# Patient Record
Sex: FEMALE | Race: WHITE | NOT HISPANIC OR LATINO | Employment: FULL TIME | URBAN - NONMETROPOLITAN AREA
[De-identification: names, ages, dates, MRNs, and addresses within clinical notes are randomized per-mention and may not be internally consistent; named-entity substitution may affect disease eponyms.]

---

## 2018-10-22 ENCOUNTER — OFFICE VISIT (OUTPATIENT)
Dept: NEUROSURGERY | Facility: CLINIC | Age: 38
End: 2018-10-22

## 2018-10-22 VITALS
SYSTOLIC BLOOD PRESSURE: 140 MMHG | BODY MASS INDEX: 41.02 KG/M2 | WEIGHT: 293 LBS | DIASTOLIC BLOOD PRESSURE: 92 MMHG | HEIGHT: 71 IN

## 2018-10-22 DIAGNOSIS — M50.20 DISPLACEMENT OF CERVICAL INTERVERTEBRAL DISC WITHOUT MYELOPATHY: Primary | ICD-10-CM

## 2018-10-22 DIAGNOSIS — Z78.9 NON-SMOKER: ICD-10-CM

## 2018-10-22 PROCEDURE — 99204 OFFICE O/P NEW MOD 45 MIN: CPT | Performed by: NURSE PRACTITIONER

## 2018-10-22 RX ORDER — METHYLPREDNISOLONE 4 MG/1
TABLET ORAL
Qty: 21 EACH | Refills: 0 | Status: SHIPPED | OUTPATIENT
Start: 2018-10-22 | End: 2019-01-03

## 2018-10-22 NOTE — PROGRESS NOTES
Chief complaint:   Chief Complaint   Patient presents with   • Neck Pain     Omid is referred today for follow up on her neck pain, she is also having right shoulder and arm pain that goes down into her right hand with some numbness.  She has not had any physical therapy or pain management.  She does bring an MRI of ther cervical with her today for review.        Subjective     HPI: This is a 38-year-old female patient who was referred to us by Dr. Rodney for neck and right arm pain.  She is here to be evaluated today.  She states that the pain in her neck began on June 18.  There is no accident or injury associated with this.  The pain in her neck is constant.  It is worse with repetitive movements it is better with ice.  She has pain that radiates from her neck all the way down into her right upper extremity in a radicular fashion to her hand.  She says this pain is intermittent but it is daily.  It is worse with repetitive movements and better with rest and ice.  She has not done any recent physical therapy or pain management injections.  She is try chiropractic care and had minimal relief.  Denies any bowel or bladder incontinence.  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  She is left-hand dominant.  She is .  She does have 3 children.  She also does have numbness and tingling in her right hand as well.  She is had previous carpal tunnel surgery in her right hand states that she did get improvement from this but this is a different issue at this time.    Review of Systems   Respiratory: Positive for cough.    Musculoskeletal: Positive for neck pain and neck stiffness.   Neurological: Positive for weakness and numbness.   All other systems reviewed and are negative.       Past Medical History:   Diagnosis Date   • No known health problems      Past Surgical History:   Procedure Laterality Date   • CARPAL TUNNEL RELEASE Bilateral    • ULNAR NERVE DECOMPRESSION Left      Family History  "  Problem Relation Age of Onset   • Arthritis Mother    • Diabetes Mother    • Heart disease Father    • No Known Problems Brother      Social History   Substance Use Topics   • Smoking status: Never Smoker   • Smokeless tobacco: Never Used   • Alcohol use No       (Not in a hospital admission)  Allergies:  Patient has no known allergies.    Objective      Vital Signs  /92 (BP Location: Right arm, Patient Position: Sitting)   Ht 180.3 cm (71\")   Wt (!) 155 kg (341 lb 12.8 oz)   BMI 47.67 kg/m²     Physical Exam   Constitutional: She is oriented to person, place, and time. She appears well-developed and well-nourished.   HENT:   Head: Normocephalic.   Eyes: Pupils are equal, round, and reactive to light. Conjunctivae, EOM and lids are normal.   Neck: Normal range of motion.   Cardiovascular: Normal rate, regular rhythm and normal heart sounds.    Pulmonary/Chest: Effort normal and breath sounds normal.   Abdominal: Normal appearance.   Musculoskeletal: Normal range of motion.        Cervical back: She exhibits pain.   Neurological: She is alert and oriented to person, place, and time. She has normal strength and normal reflexes. She displays normal reflexes. No cranial nerve deficit or sensory deficit. GCS eye subscore is 4. GCS verbal subscore is 5. GCS motor subscore is 6.   Skin: Skin is warm.   Psychiatric: She has a normal mood and affect. Her speech is normal and behavior is normal. Thought content normal. Cognition and memory are normal.       Results Review: MRI of the cervical spine that was done at [facility] shows that she has a C3-for spurring off to the left causing neural foraminal narrowing.  At C5-6 she does have a disc displacement that is causing pressure on the exiting nerve root on the right.  No fracture visual eyes.  No cord signal change.  She does have loss of her normal cervical curvature.          Assessment/Plan: At this point I am going to start the patient into a " dedicated course of physical therapy consisting of 2-3 times a week for 4-6 weeks.  Should she not have any improvement from the therapy she may be a good candidate for a single level ACDF at C5-6.  I also did refer the patient to bariatric for weight loss management and discussion with a dietitian.  I did discuss this in detail with the patient regarding her weight.  She acknowledged understanding of this.  I will also start her on a Medrol Dosepak to see this will help with the pain that she is experiencing.  BMI shows that she is very overweight.  BMI chart was given the patient.  She is a nonsmoker      Dipika was seen today for neck pain.    Diagnoses and all orders for this visit:    Displacement of cervical intervertebral disc without myelopathy  -     Ambulatory Referral to Physical Therapy Evaluate and treat (2-3 days a week for 4-6 weeks)  -     Ambulatory Referral to Bariatric Surgery    Non-smoker    BMI 45.0-49.9, adult (CMS/[facility])    Other orders  -     MethylPREDNISolone (MEDROL, VERONICA,) 4 MG tablet; Take as directed on package instructions.          I discussed the patients findings and my recommendations with patient    Mehul Narvaez, MALI  10/22/18  11:55 AM

## 2018-12-07 ENCOUNTER — TELEPHONE (OUTPATIENT)
Dept: NEUROSURGERY | Facility: CLINIC | Age: 38
End: 2018-12-07

## 2018-12-07 NOTE — TELEPHONE ENCOUNTER
Patient calls, stating that the numbness/pain in her arm is worsening. She states that she completed the steroid daniele that was given to her but this did not improve her symptoms. She is requesting something additional, specifically a muscle relaxer. Advised her the providers and medical assistant was out today, but would forward for their review on Monday. Please return patient's call.

## 2018-12-10 RX ORDER — TIZANIDINE 4 MG/1
4 TABLET ORAL EVERY 8 HOURS PRN
Qty: 90 TABLET | Refills: 2 | Status: SHIPPED | OUTPATIENT
Start: 2018-12-10 | End: 2019-08-20 | Stop reason: SDUPTHER

## 2018-12-10 NOTE — TELEPHONE ENCOUNTER
I sent Zanaflex to her pharmacy.  We can see about getting her in sooner.  Elkins, IF Dr. Vernon has an opening she is a surgical candidate.  If Dr. Vernon does not have anything sooner than we can try and work her in on my schedule on a day when Dr. Vernon is here.

## 2018-12-11 ENCOUNTER — APPOINTMENT (OUTPATIENT)
Dept: BARIATRICS/WEIGHT MGMT | Facility: HOSPITAL | Age: 38
End: 2018-12-11

## 2019-01-03 ENCOUNTER — OFFICE VISIT (OUTPATIENT)
Dept: NEUROSURGERY | Facility: CLINIC | Age: 39
End: 2019-01-03

## 2019-01-03 VITALS
SYSTOLIC BLOOD PRESSURE: 136 MMHG | BODY MASS INDEX: 41.02 KG/M2 | WEIGHT: 293 LBS | HEIGHT: 71 IN | DIASTOLIC BLOOD PRESSURE: 82 MMHG

## 2019-01-03 DIAGNOSIS — M50.20 DISPLACEMENT OF CERVICAL INTERVERTEBRAL DISC WITHOUT MYELOPATHY: Primary | ICD-10-CM

## 2019-01-03 DIAGNOSIS — Z78.9 NON-SMOKER: ICD-10-CM

## 2019-01-03 PROCEDURE — 99213 OFFICE O/P EST LOW 20 MIN: CPT | Performed by: NEUROLOGICAL SURGERY

## 2019-01-03 RX ORDER — CYCLOBENZAPRINE HCL 10 MG
TABLET ORAL
Refills: 0 | COMMUNITY
Start: 2018-12-07 | End: 2019-01-03 | Stop reason: ALTCHOICE

## 2019-01-03 RX ORDER — GABAPENTIN 100 MG/1
CAPSULE ORAL
COMMUNITY
End: 2019-09-17 | Stop reason: DRUGHIGH

## 2019-01-03 RX ORDER — CALCIPOTRIENE 50 UG/G
CREAM TOPICAL
Refills: 1 | COMMUNITY
Start: 2018-12-31 | End: 2019-11-05

## 2019-01-03 NOTE — PATIENT INSTRUCTIONS
PATIENT TO CONTINUE TO FOLLOW UP WITH HER PRIMARY CARE PROVIDER FOR YEARLY PHYSICAL EXAMS TO ENSURE COMPLETE HEALTH MAINTENANCE      BMI for Adults  Body mass index (BMI) is a number that is calculated from a person's weight and height. In most adults, the number is used to find how much of an adult's weight is made up of fat. BMI is not as accurate as a direct measure of body fat.  How is BMI calculated?  BMI is calculated by dividing weight in kilograms by height in meters squared. It can also be calculated by dividing weight in pounds by height in inches squared, then multiplying the resulting number by 703. Charts are available to help you find your BMI quickly and easily without doing this calculation.  How is BMI interpreted?  Health care professionals use BMI charts to identify whether an adult is underweight, at a normal weight, or overweight based on the following guidelines:  · Underweight: BMI less than 18.5.  · Normal weight: BMI between 18.5 and 24.9.  · Overweight: BMI between 25 and 29.9.  · Obese: BMI of 30 and above.    BMI is usually interpreted the same for males and females.  Weight includes both fat and muscle, so someone with a muscular build, such as an athlete, may have a BMI that is higher than 24.9. In cases like these, BMI may not accurately depict body fat. To determine if excess body fat is the cause of a BMI of 25 or higher, further assessments may need to be done by a health care provider.  Why is BMI a useful tool?  BMI is used to identify a possible weight problem that may be related to a medical problem or may increase the risk for medical problems. BMI can also be used to promote changes to reach a healthy weight.  This information is not intended to replace advice given to you by your health care provider. Make sure you discuss any questions you have with your health care provider.  Document Released: 08/29/2005 Document Revised: 04/27/2017 Document Reviewed: 05/15/2015  Jaz[employer]  Interactive Patient Education © 2018 [employer]

## 2019-01-03 NOTE — PROGRESS NOTES
SUBJECTIVE:  Patient ID: Dipika Ford is a 38 y.o. female is here today for follow-up.    Chief Complaint: Neck pain  Chief Complaint   Patient presents with   • Neck Pain     patient is here for follow up after completing 4 PT sessions (she stated she is awaiting a callback with an approval for more sessions); she states she wasn't able to do any more due to the holidays, etc.  She does state that she got a Coke for Penny and she says it has helped her symptoms until today when the pain has returned (she states she hasn't had any Coke/caffeine today)       HPI  38-year-old female been fine for neck pain and right upper extremity radicular pain.  She was sent to a dedicated course physical therapy.  She did 4 sessions which she did not feel was helpful.  She has been taking tizanidine and night.  In addition she started to consume caffeine during the day and she does feel that this is done a lot to help the pain that she is having in the right arm and neck.  If she does not drink caffeine she says she feels worse and the pain returns.    The following portions of the patient's history were reviewed and updated as appropriate: allergies, current medications, past family history, past medical history, past social history, past surgical history and problem list.    OBJECTIVE:    Review of Systems   Musculoskeletal: Positive for neck pain.   All other systems reviewed and are negative.         Physical Exam   Constitutional: She is oriented to person, place, and time. She appears well-developed and well-nourished.   HENT:   Head: Normocephalic and atraumatic.   Right Ear: Hearing normal.   Left Ear: Hearing normal.   Eyes: EOM are normal. Pupils are equal, round, and reactive to light.   Neck: Normal range of motion.   Neurological: She is alert and oriented to person, place, and time. She has normal strength and normal reflexes. No cranial nerve deficit or sensory deficit. She displays a negative Romberg  sign. GCS eye subscore is 4. GCS verbal subscore is 5. GCS motor subscore is 6. She displays no Babinski's sign on the right side. She displays no Babinski's sign on the left side.   Psychiatric: Her speech is normal. Judgment normal. Cognition and memory are normal.       Neurologic Exam     Mental Status   Oriented to person, place, and time.   Speech: speech is normal     Cranial Nerves     CN III, IV, VI   Pupils are equal, round, and reactive to light.  Extraocular motions are normal.     Motor Exam     Strength   Strength 5/5 throughout.       Independent Review of Radiographic Studies:   MRI the cervical spine does show a right paraspinal disc herniation at C5-6.    ASSESSMENT/PLAN:  The patient seems to be improving over the past month therapy and muscle relaxers and some caffeine intake during the day she says seems to improve her pain.  She is going to continue with another month of therapy.  We will see her in follow-up in 4 weeks      1. Displacement of cervical intervertebral disc without myelopathy    2. BMI 45.0-49.9, adult (CMS/HCC)    3. Non-smoker            Return in about 6 weeks (around 2/14/2019) for PT follow up w/CLARISA.      John Vernon MD

## 2019-01-28 DIAGNOSIS — M50.20 DISPLACEMENT OF CERVICAL INTERVERTEBRAL DISC WITHOUT MYELOPATHY: Primary | ICD-10-CM

## 2019-02-28 ENCOUNTER — TELEPHONE (OUTPATIENT)
Dept: NEUROSURGERY | Facility: CLINIC | Age: 39
End: 2019-02-28

## 2019-02-28 NOTE — TELEPHONE ENCOUNTER
Left message on patient's VM to call office and regarding an appointment on 02/28/19 that needs to be rescheduled as Mehul Narvaez is out of the office for family emergency.

## 2019-04-01 ENCOUNTER — OFFICE VISIT (OUTPATIENT)
Dept: NEUROSURGERY | Facility: CLINIC | Age: 39
End: 2019-04-01

## 2019-04-01 VITALS
SYSTOLIC BLOOD PRESSURE: 142 MMHG | HEIGHT: 71 IN | WEIGHT: 293 LBS | DIASTOLIC BLOOD PRESSURE: 88 MMHG | BODY MASS INDEX: 41.02 KG/M2

## 2019-04-01 DIAGNOSIS — Z78.9 NON-SMOKER: ICD-10-CM

## 2019-04-01 DIAGNOSIS — M50.20 DISPLACEMENT OF CERVICAL INTERVERTEBRAL DISC WITHOUT MYELOPATHY: Primary | ICD-10-CM

## 2019-04-01 PROCEDURE — 99213 OFFICE O/P EST LOW 20 MIN: CPT | Performed by: NURSE PRACTITIONER

## 2019-04-01 NOTE — PROGRESS NOTES
"    Chief complaint:   Chief Complaint   Patient presents with   • Neck Pain     Dipika is returning today after some physical therapy for her neck pain, she states that is has helped some but the pain is still there.         Subjective     HPI: This is a 39-year-old female patient who we have been following for the C5-6 displacement and having neck pain and right arm pain.  She is here to be evaluated today after physical therapy.  She says that she is no longer having any radicular arm pain.  She is having pain from her neck over to the shoulder.  Denies any bowel or bladder incontinence.  She has done 9 sessions of therapy since we seen her last and states that she did not get any worse from the therapy.  She says that the majority of the time it is the numbness in her arm that bothers her more.  She says that it bothers her more so when she is sitting but if she is up moving or driving does not bother her.    Review of Systems   Musculoskeletal: Positive for neck pain.   Neurological: Positive for numbness.         Objective      Vital Signs  /88 (BP Location: Right arm, Patient Position: Sitting)   Ht 180.3 cm (71\")   Wt (!) 148 kg (327 lb 3.2 oz)   BMI 45.64 kg/m²     Physical Exam   Constitutional: She is oriented to person, place, and time. She appears well-developed and well-nourished.   HENT:   Head: Normocephalic.   Eyes: EOM are normal. Pupils are equal, round, and reactive to light.   Neck: Normal range of motion.   Pulmonary/Chest: Effort normal.   Musculoskeletal: Normal range of motion.        Cervical back: She exhibits pain.   Neurological: She is alert and oriented to person, place, and time. She has normal strength and normal reflexes. No cranial nerve deficit or sensory deficit. Gait normal. GCS eye subscore is 4. GCS verbal subscore is 5. GCS motor subscore is 6.   Skin: Skin is warm.   Psychiatric: She has a normal mood and affect. Her speech is normal and behavior is normal. Thought " content normal.       Results Review: No new imaging          Assessment/Plan: At this point I would suggest the patient go for a dedicated trial of injections in her neck to see if this will help with the pain and the numbness and tingling that she is experiencing.  She is agreeable to this and would like to try this before any surgical intervention.  We will follow-up with her in 4 months at which time she will see Dr. Vernon.    BMI shows the patient is Extremely Obese. BMI chart was given to the patient. Patient is a non-smoker    Dipika was seen today for neck pain.    Diagnoses and all orders for this visit:    Displacement of cervical intervertebral disc without myelopathy  -     Ambulatory Referral to Pain Management    Non-smoker    BMI 45.0-49.9, adult (CMS/[facility])        I discussed the patients findings and my recommendations with patient  MALI Valdez  04/01/19  10:47 AM

## 2019-04-01 NOTE — PATIENT INSTRUCTIONS

## 2019-08-20 RX ORDER — TIZANIDINE 4 MG/1
TABLET ORAL
Qty: 90 TABLET | Refills: 0 | Status: SHIPPED | OUTPATIENT
Start: 2019-08-20 | End: 2019-10-06 | Stop reason: SDUPTHER

## 2019-09-17 ENCOUNTER — OFFICE VISIT (OUTPATIENT)
Dept: NEUROSURGERY | Facility: CLINIC | Age: 39
End: 2019-09-17

## 2019-09-17 VITALS
SYSTOLIC BLOOD PRESSURE: 140 MMHG | DIASTOLIC BLOOD PRESSURE: 82 MMHG | WEIGHT: 293 LBS | BODY MASS INDEX: 41.02 KG/M2 | HEIGHT: 71 IN

## 2019-09-17 DIAGNOSIS — M79.601 RIGHT ARM PAIN: ICD-10-CM

## 2019-09-17 DIAGNOSIS — M50.20 DISPLACEMENT OF CERVICAL INTERVERTEBRAL DISC WITHOUT MYELOPATHY: Primary | ICD-10-CM

## 2019-09-17 DIAGNOSIS — Z78.9 NON-SMOKER: ICD-10-CM

## 2019-09-17 DIAGNOSIS — R20.2 NUMBNESS AND TINGLING OF RIGHT UPPER EXTREMITY: ICD-10-CM

## 2019-09-17 DIAGNOSIS — R20.0 NUMBNESS AND TINGLING OF RIGHT UPPER EXTREMITY: ICD-10-CM

## 2019-09-17 PROCEDURE — 99213 OFFICE O/P EST LOW 20 MIN: CPT | Performed by: NEUROLOGICAL SURGERY

## 2019-09-17 RX ORDER — GABAPENTIN 300 MG/1
CAPSULE ORAL
Refills: 1 | COMMUNITY
Start: 2019-08-29 | End: 2020-01-03

## 2019-09-17 RX ORDER — LIDOCAINE AND PRILOCAINE 25; 25 MG/G; MG/G
CREAM TOPICAL
Refills: 5 | COMMUNITY
Start: 2019-08-19 | End: 2019-11-05

## 2019-09-17 RX ORDER — DICLOFENAC SODIUM 75 MG/1
75 TABLET, DELAYED RELEASE ORAL 2 TIMES DAILY
Refills: 1 | COMMUNITY
Start: 2019-08-29 | End: 2020-01-02 | Stop reason: SDUPTHER

## 2019-09-17 RX ORDER — DESIPRAMINE HYDROCHLORIDE 25 MG/1
25 TABLET ORAL DAILY
Refills: 1 | COMMUNITY
Start: 2019-08-29 | End: 2020-01-03

## 2019-09-17 RX ORDER — CAPSAICIN 0.025 %
CREAM (GRAM) TOPICAL
Refills: 2 | COMMUNITY
Start: 2019-07-22 | End: 2019-11-05

## 2019-09-17 NOTE — PROGRESS NOTES
SUBJECTIVE:  Patient ID: Dipika Ford is a 39 y.o. female is here today for follow-up.    Chief Complaint: Neck pain  Chief Complaint   Patient presents with   • Neck Pain     patient is here for 5 month follow up after PMI; patient states the first injection helped for about a week but the second hasn't helped at all.       HPI  This a 39-year-old female we have been following for about a year now for complaints of neck pain and some cervical disc herniation.  She did a dedicated course of physical therapy which did resolve a lot of her radicular pain symptoms in her right arm.  She continued to have some right paraspinal neck pain and right trapezius pain.  She had pretty consistent with complaining of numbness and tingling in her right hand.  She is had 2 injections from Dr. garcia and she says that did help for 1 to 2 weeks with her neck pain and right paraspinal neck pain.  Today her biggest complaint though is this numbness and tingling in her right hand forearm that a sense of her arm.  She says this is a bothersome and uncomfortable.  It is worse with sitting does not bother her at night.  She has had carpal tunnel syndrome in the right and left hands in the past and had carpal tunnel release surgery.  She is very clear that this does not feel like carpal tunnel syndrome.    The following portions of the patient's history were reviewed and updated as appropriate: allergies, current medications, past family history, past medical history, past social history, past surgical history and problem list.    OBJECTIVE:    Review of Systems   Musculoskeletal: Positive for neck pain.   Neurological: Positive for numbness.   All other systems reviewed and are negative.         Physical Exam   Constitutional: She is oriented to person, place, and time. She appears well-developed and well-nourished.   HENT:   Head: Normocephalic and atraumatic.   Right Ear: Hearing normal.   Left Ear: Hearing normal.   Eyes: EOM are  normal. Pupils are equal, round, and reactive to light.   Neck: Normal range of motion.   Neurological: She is alert and oriented to person, place, and time. She has normal strength and normal reflexes. No cranial nerve deficit or sensory deficit. She displays a negative Romberg sign. GCS eye subscore is 4. GCS verbal subscore is 5. GCS motor subscore is 6. She displays no Babinski's sign on the right side. She displays no Babinski's sign on the left side.   Psychiatric: Her speech is normal. Judgment normal. Cognition and memory are normal.       Neurologic Exam     Mental Status   Oriented to person, place, and time.   Speech: speech is normal     Cranial Nerves     CN III, IV, VI   Pupils are equal, round, and reactive to light.  Extraocular motions are normal.     Motor Exam     Strength   Strength 5/5 throughout.   Negative Phalen sign    Independent Review of Radiographic Studies:   MRI of the cervical spine from over a year ago shows a right paraspinal disc herniation at C5-6    ASSESSMENT/PLAN:  The patient's symptoms have all been changed over the past year to the point now her biggest complaint is this uncomfortable numbness and tingling in her right hand forearm which a sense of her arm.  This certainly could be from her disc herniation at C5-6 but it is a little atypical.  I would recommend EMG nerve conduction study as well as repeat of her MRI of the cervical spine.  At that point we can make a better recommendation as to whether surgical intervention in her neck would be appropriate.  We will see her in follow-up after that testing is completed.      1. Displacement of cervical intervertebral disc without myelopathy    2. Numbness and tingling of right upper extremity    3. Right arm pain    4. BMI 40.0-44.9, adult (CMS/HCC)    5. Non-smoker            No Follow-up on file.      John Vernon MD

## 2019-10-07 RX ORDER — TIZANIDINE 4 MG/1
TABLET ORAL
Qty: 90 TABLET | Refills: 0 | Status: SHIPPED | OUTPATIENT
Start: 2019-10-07 | End: 2019-11-05 | Stop reason: SDUPTHER

## 2019-10-14 ENCOUNTER — HOSPITAL ENCOUNTER (OUTPATIENT)
Dept: MRI IMAGING | Facility: HOSPITAL | Age: 39
Discharge: HOME | End: 2019-10-14

## 2019-10-14 ENCOUNTER — HOSPITAL ENCOUNTER (OUTPATIENT)
Dept: NEUROLOGY | Facility: HOSPITAL | Age: 39
Discharge: HOME | End: 2019-10-14
Admitting: NEUROLOGICAL SURGERY

## 2019-10-14 DIAGNOSIS — M79.601 RIGHT ARM PAIN: ICD-10-CM

## 2019-10-14 DIAGNOSIS — R20.0 NUMBNESS AND TINGLING OF RIGHT UPPER EXTREMITY: ICD-10-CM

## 2019-10-14 DIAGNOSIS — M50.20 DISPLACEMENT OF CERVICAL INTERVERTEBRAL DISC WITHOUT MYELOPATHY: ICD-10-CM

## 2019-10-14 DIAGNOSIS — R20.2 NUMBNESS AND TINGLING OF RIGHT UPPER EXTREMITY: ICD-10-CM

## 2019-10-14 PROCEDURE — 95886 MUSC TEST DONE W/N TEST COMP: CPT

## 2019-10-14 PROCEDURE — 95909 NRV CNDJ TST 5-6 STUDIES: CPT

## 2019-10-14 PROCEDURE — 72141 MRI NECK SPINE W/O DYE: CPT

## 2019-11-05 ENCOUNTER — OFFICE VISIT (OUTPATIENT)
Dept: NEUROSURGERY | Facility: CLINIC | Age: 39
End: 2019-11-05

## 2019-11-05 VITALS
SYSTOLIC BLOOD PRESSURE: 132 MMHG | WEIGHT: 293 LBS | DIASTOLIC BLOOD PRESSURE: 80 MMHG | BODY MASS INDEX: 41.02 KG/M2 | HEIGHT: 71 IN

## 2019-11-05 DIAGNOSIS — R20.2 NUMBNESS AND TINGLING OF RIGHT UPPER EXTREMITY: ICD-10-CM

## 2019-11-05 DIAGNOSIS — R20.0 NUMBNESS AND TINGLING OF RIGHT UPPER EXTREMITY: ICD-10-CM

## 2019-11-05 DIAGNOSIS — M50.20 DISPLACEMENT OF CERVICAL INTERVERTEBRAL DISC WITHOUT MYELOPATHY: Primary | ICD-10-CM

## 2019-11-05 DIAGNOSIS — Z78.9 NON-SMOKER: ICD-10-CM

## 2019-11-05 PROCEDURE — 99214 OFFICE O/P EST MOD 30 MIN: CPT | Performed by: NEUROLOGICAL SURGERY

## 2019-11-05 RX ORDER — TIZANIDINE 4 MG/1
TABLET ORAL
Qty: 90 TABLET | Refills: 0 | Status: SHIPPED | OUTPATIENT
Start: 2019-11-05 | End: 2019-12-04 | Stop reason: SDUPTHER

## 2019-11-05 NOTE — H&P
SUBJECTIVE:  Patient ID: Dipika Ford is a 39 y.o. female is here today for follow-up.    Chief Complaint: Neck pain  Chief Complaint   Patient presents with   • neck & arm pain     patient has complaints of N/T in her right hand and forearm; she is here for follow up to discuss the MRI findings (P on 10/14/19) and the RUE EMG/NCV results (10/14/19 [facility])       HPI  39-year-old female that we have been following for right paraspinal neck pain, right upper extremity radicular pain along with numbness and tingling.  She is gone through an extensive course of conservative care involving physical therapy, medication, anti-inflammatories, pain management injections.  She has had mixed results but still has a significant amount of lingering symptoms.  We sent her for repeat MRI and an EMG nerve conduction study she is here to discuss the results.    The following portions of the patient's history were reviewed and updated as appropriate: allergies, current medications, past family history, past medical history, past social history, past surgical history and problem list.    OBJECTIVE:    Review of Systems   Musculoskeletal: Positive for neck pain.   Neurological: Positive for numbness.   All other systems reviewed and are negative.         Physical Exam   Constitutional: She is oriented to person, place, and time.   Neurological: She is oriented to person, place, and time. She has normal strength. She has a normal Finger-Nose-Finger Test. Gait normal.   Reflex Scores:       Tricep reflexes are 1+ on the right side and 2+ on the left side.       Bicep reflexes are 0 on the right side and 2+ on the left side.       Brachioradialis reflexes are 0 on the right side and 2+ on the left side.  Psychiatric: Her speech is normal.       Neurologic Exam     Mental Status   Oriented to person, place, and time.   Speech: speech is normal   Level of consciousness: alert  Knowledge: good.     Cranial Nerves   Cranial  nerves II through XII intact.     Motor Exam   Muscle bulk: normal  Overall muscle tone: normal  Right arm pronator drift: absent  Left arm pronator drift: absent    Strength   Strength 5/5 throughout.     Sensory Exam   Right arm light touch: decreased from elbow  Right arm pinprick: decreased from elbow    Gait, Coordination, and Reflexes     Gait  Gait: normal    Coordination   Finger to nose coordination: normal    Tremor   Resting tremor: absent  Intention tremor: absent  Action tremor: absent    Reflexes   Right brachioradialis: 0  Left brachioradialis: 2+  Right biceps: 0  Left biceps: 2+  Right triceps: 1+  Left triceps: 2+      Independent Review of Radiographic Studies:   EMG nerve conduction study is negative.  MRI of the cervical spine shows cervical disc herniation eccentric to the right at C5-6.    ASSESSMENT/PLAN:  The patient has a disc herniation at C5-6 which is responsible for her right upper extremity symptoms.  The EMG nerve conduction study was negative for any peripheral nerve entrapment.  I would recommend a single level anterior cervical fusion at this point.  The risks and benefits were discussed at length which include but were not limited to infection, bleeding, paralysis, spinal fluid leak, speech and swallow difficulty, stroke, coma, and death.  The patient acknowledged understand this.  Her questions and concerns were addressed.  We will get her prepped and schedule this soon as possible.      1. Displacement of cervical intervertebral disc without myelopathy    2. Numbness and tingling of right upper extremity    3. BMI 40.0-44.9, adult (CMS/HCC)    4. Non-smoker            Return for postoperatively.      John Vernon MD

## 2019-12-04 RX ORDER — TIZANIDINE 4 MG/1
TABLET ORAL
Qty: 90 TABLET | Refills: 0 | Status: SHIPPED | OUTPATIENT
Start: 2019-12-04 | End: 2020-01-06

## 2019-12-30 ENCOUNTER — TELEPHONE (OUTPATIENT)
Dept: NEUROSURGERY | Facility: CLINIC | Age: 39
End: 2019-12-30

## 2020-01-02 RX ORDER — DICLOFENAC SODIUM 75 MG/1
75 TABLET, DELAYED RELEASE ORAL 2 TIMES DAILY
Qty: 60 TABLET | Refills: 1 | Status: SHIPPED | OUTPATIENT
Start: 2020-01-02 | End: 2020-01-21 | Stop reason: HOSPADM

## 2020-01-03 ENCOUNTER — APPOINTMENT (OUTPATIENT)
Dept: PREADMISSION TESTING | Facility: HOSPITAL | Age: 40
End: 2020-01-03

## 2020-01-03 VITALS
DIASTOLIC BLOOD PRESSURE: 69 MMHG | HEART RATE: 68 BPM | BODY MASS INDEX: 41.02 KG/M2 | OXYGEN SATURATION: 100 % | HEIGHT: 71 IN | WEIGHT: 293 LBS | RESPIRATION RATE: 16 BRPM | SYSTOLIC BLOOD PRESSURE: 135 MMHG

## 2020-01-03 DIAGNOSIS — M50.20 DISPLACEMENT OF CERVICAL INTERVERTEBRAL DISC WITHOUT MYELOPATHY: ICD-10-CM

## 2020-01-03 LAB
ALBUMIN SERPL-MCNC: 4.5 G/DL (ref 3.5–5.2)
ALBUMIN/GLOB SERPL: 1.5 G/DL
ALP SERPL-CCNC: 57 U/L (ref 39–117)
ALT SERPL W P-5'-P-CCNC: 18 U/L (ref 1–33)
ANION GAP SERPL CALCULATED.3IONS-SCNC: 11 MMOL/L (ref 5–15)
AST SERPL-CCNC: 15 U/L (ref 1–32)
BACTERIA UR QL AUTO: ABNORMAL /HPF
BILIRUB SERPL-MCNC: 0.4 MG/DL (ref 0.2–1.2)
BILIRUB UR QL STRIP: NEGATIVE
BUN BLD-MCNC: 14 MG/DL (ref 6–20)
BUN/CREAT SERPL: 21.5 (ref 7–25)
CALCIUM SPEC-SCNC: 9.1 MG/DL (ref 8.6–10.5)
CHLORIDE SERPL-SCNC: 102 MMOL/L (ref 98–107)
CLARITY UR: ABNORMAL
CO2 SERPL-SCNC: 26 MMOL/L (ref 22–29)
COLOR UR: ABNORMAL
CREAT BLD-MCNC: 0.65 MG/DL (ref 0.57–1)
DEPRECATED RDW RBC AUTO: 35.4 FL (ref 37–54)
ERYTHROCYTE [DISTWIDTH] IN BLOOD BY AUTOMATED COUNT: 12.2 % (ref 12.3–15.4)
GFR SERPL CREATININE-BSD FRML MDRD: 102 ML/MIN/1.73
GLOBULIN UR ELPH-MCNC: 3 GM/DL
GLUCOSE BLD-MCNC: 179 MG/DL (ref 65–99)
GLUCOSE UR STRIP-MCNC: NEGATIVE MG/DL
HCT VFR BLD AUTO: 37.7 % (ref 34–46.6)
HGB BLD-MCNC: 13 G/DL (ref 12–15.9)
HGB UR QL STRIP.AUTO: NEGATIVE
HYALINE CASTS UR QL AUTO: ABNORMAL /LPF
KETONES UR QL STRIP: NEGATIVE
LEUKOCYTE ESTERASE UR QL STRIP.AUTO: ABNORMAL
MCH RBC QN AUTO: 27.8 PG (ref 26.6–33)
MCHC RBC AUTO-ENTMCNC: 34.5 G/DL (ref 31.5–35.7)
MCV RBC AUTO: 80.7 FL (ref 79–97)
NITRITE UR QL STRIP: NEGATIVE
PH UR STRIP.AUTO: 6 [PH] (ref 5–8)
PLATELET # BLD AUTO: 217 10*3/MM3 (ref 140–450)
PMV BLD AUTO: 9.1 FL (ref 6–12)
POTASSIUM BLD-SCNC: 3.8 MMOL/L (ref 3.5–5.2)
PROT SERPL-MCNC: 7.5 G/DL (ref 6–8.5)
PROT UR QL STRIP: ABNORMAL
RBC # BLD AUTO: 4.67 10*6/MM3 (ref 3.77–5.28)
RBC # UR: ABNORMAL /HPF
REF LAB TEST METHOD: ABNORMAL
SODIUM BLD-SCNC: 139 MMOL/L (ref 136–145)
SP GR UR STRIP: >=1.03 (ref 1–1.03)
SQUAMOUS #/AREA URNS HPF: ABNORMAL /HPF
UROBILINOGEN UR QL STRIP: ABNORMAL
WBC NRBC COR # BLD: 7.89 10*3/MM3 (ref 3.4–10.8)
WBC UR QL AUTO: ABNORMAL /HPF

## 2020-01-03 PROCEDURE — 93005 ELECTROCARDIOGRAM TRACING: CPT

## 2020-01-03 PROCEDURE — 87086 URINE CULTURE/COLONY COUNT: CPT | Performed by: NEUROLOGICAL SURGERY

## 2020-01-03 PROCEDURE — 85027 COMPLETE CBC AUTOMATED: CPT | Performed by: NEUROLOGICAL SURGERY

## 2020-01-03 PROCEDURE — 93010 ELECTROCARDIOGRAM REPORT: CPT | Performed by: INTERNAL MEDICINE

## 2020-01-03 PROCEDURE — 80053 COMPREHEN METABOLIC PANEL: CPT | Performed by: NEUROLOGICAL SURGERY

## 2020-01-03 PROCEDURE — 81001 URINALYSIS AUTO W/SCOPE: CPT | Performed by: NEUROLOGICAL SURGERY

## 2020-01-03 PROCEDURE — 36415 COLL VENOUS BLD VENIPUNCTURE: CPT

## 2020-01-03 NOTE — DISCHARGE INSTRUCTIONS
DAY OF SURGERY INSTRUCTIONS        YOUR SURGEON: ***SHARON PRYOR     PROCEDURE: ***CERVICAL DISCECTOMY ANTERIOR WITH FUSION     DATE OF SURGERY: ***    ARRIVAL TIME: AS DIRECTED BY OFFICE      ALL OTHER HOME MEDICATION CHECK WITH YOUR PHYSICIAN                MANAGING PAIN AFTER SURGERY    We know you are probably wondering what your pain will be like after surgery.  Following surgery it is unrealistic to expect you will not have pain.   Pain is how our bodies let us know that something is wrong or cautions us to be careful.  That said, our goal is to make your pain tolerable.    Methods we may use to treat your pain include (oral or IV medications, PCAs, epidurals, nerve blocks, etc.)   While some procedures require IV pain medications for a short time after surgery, transitioning to pain medications by mouth allows for better management of pain.   Your nurse will encourage you to take oral pain medications whenever possible.  IV medications work almost immediately, but only last a short while.  Taking medications by mouth allows for a more constant level of medication in your blood stream for a longer period of time.      Once your pain is out of control it is harder to get back under control.  It is important you are aware when your next dose of pain medication is due.  If you are admitted, your nurse may write the time of your next dose on the white board in your room to help you remember.      We are interested in your pain and encourage you to inform us about aggravating factors during your visit.   Many times a simple repositioning every few hours can make a big difference.    If your physician says it is okay, do not let your pain prevent you from getting out of bed. Be sure to call your nurse for assistance prior to getting up so you do not fall.      Before surgery, please decide your tolerable pain goal.  These faces help describe the pain ratings we use on a 0-10 scale.   Be prepared to tell us your  goal and whether or not you take pain or anxiety medications at home.          BEFORE YOU COME TO THE HOSPITAL  (Pre-op instructions)  • Do not eat, drink, smoke or chew gum after midnight the night before surgery.  This also includes no mints.  • Morning of surgery take only the medicines you have been instructed with a sip of water unless otherwise instructed  by your physician.  • Do not shave, wear makeup or dark nail polish.  • Remove all jewelry including rings.  • Leave anything you consider valuable at home.  • Leave your suitcase in the car until after your surgery.  • Bring the following with you if applicable:  o Picture ID and insurance, Medicare or Medicaid cards  o Co-pay/deductible required by insurance (cash, check, credit card)  o Copy of advance directive, living will or power-of- documents if not brought to PAT  o CPAP or BIPAP mask and tubing  o Relaxation aids ( book, magazine), etc.  o Hearing aids                        ON THE DAY OF SURGERY  · On the day of surgery check in at registration located at the main entrance of the hospital.   ? You will be registered and given a beeper with instructions where to wait in the main lobby.  ? When your beeper lights up and vibrates a member of the Outpatient Surgery staff will meet you at the double doors under the stair steps and escort you to your preoperative room.   · You may have cloth compression devices placed on your legs. These help to prevent blood clots and reduce swelling in your legs.  · An IV may be inserted into one of your veins.  · In the operating room, you may be given one or more of the following:  ? A medicine to help you relax (sedative).  ? A medicine to numb the area (local anesthetic).  ? A medicine to make you fall asleep (general anesthetic).  ? A medicine that is injected into an area of your body to numb everything below the injection site (regional anesthetic).  · Your surgical site will be marked or  "identified.  · You may be given an antibiotic through your IV to help prevent infection.  Contact a health care provider if you:  · Develop a fever of more than 100.4°F (38°C) or other feelings of illness during the 48 hours before your surgery.  · Have symptoms that get worse.  Have questions or concerns about your surgery    General Anesthesia/Surgery, Adult  General anesthesia is the use of medicines to make a person \"go to sleep\" (unconscious) for a medical procedure. General anesthesia must be used for certain procedures, and is often recommended for procedures that:  · Last a long time.  · Require you to be still or in an unusual position.  · Are major and can cause blood loss.  The medicines used for general anesthesia are called general anesthetics. As well as making you unconscious for a certain amount of time, these medicines:  · Prevent pain.  · Control your blood pressure.  · Relax your muscles.  Tell a health care provider about:  · Any allergies you have.  · All medicines you are taking, including vitamins, herbs, eye drops, creams, and over-the-counter medicines.  · Any problems you or family members have had with anesthetic medicines.  · Types of anesthetics you have had in the past.  · Any blood disorders you have.  · Any surgeries you have had.  · Any medical conditions you have.  · Any recent upper respiratory, chest, or ear infections.  · Any history of:  ? Heart or lung conditions, such as heart failure, sleep apnea, asthma, or chronic obstructive pulmonary disease (COPD).  ?  service.  ? Depression or anxiety.  · Any tobacco or drug use, including marijuana or alcohol use.  · Whether you are pregnant or may be pregnant.  What are the risks?  Generally, this is a safe procedure. However, problems may occur, including:  · Allergic reaction.  · Lung and heart problems.  · Inhaling food or liquid from the stomach into the lungs (aspiration).  · Nerve injury.  · Air in the bloodstream, which " can lead to stroke.  · Extreme agitation or confusion (delirium) when you wake up from the anesthetic.  · Waking up during your procedure and being unable to move. This is rare.  These problems are more likely to develop if you are having a major surgery or if you have an advanced or serious medical condition. You can prevent some of these complications by answering all of your health care provider's questions thoroughly and by following all instructions before your procedure.  General anesthesia can cause side effects, including:  · Nausea or vomiting.  · A sore throat from the breathing tube.  · Hoarseness.  · Wheezing or coughing.  · Shaking chills.  · Tiredness.  · Body aches.  · Anxiety.  · Sleepiness or drowsiness.  · Confusion or agitation.  RISKS AND COMPLICATIONS OF SURGERY  Your health care provider will discuss possible risks and complications with you before surgery. Common risks and complications include:    · Problems due to the use of anesthetics.  · Blood loss and replacement (does not apply to minor surgical procedures).  · Temporary increase in pain due to surgery.  · Uncorrected pain or problems that the surgery was meant to correct.  · Infection.  · New damage.    What happens before the procedure?    Medicines  Ask your health care provider about:  · Changing or stopping your regular medicines. This is especially important if you are taking diabetes medicines or blood thinners.  · Taking medicines such as aspirin and ibuprofen. These medicines can thin your blood. Do not take these medicines unless your health care provider tells you to take them.  · Taking over-the-counter medicines, vitamins, herbs, and supplements. Do not take these during the week before your procedure unless your health care provider approves them.  General instructions  · Starting 3-6 weeks before the procedure, do not use any products that contain nicotine or tobacco, such as cigarettes and e-cigarettes. If you need help  quitting, ask your health care provider.  · If you brush your teeth on the morning of the procedure, make sure to spit out all of the toothpaste.  · Tell your health care provider if you become ill or develop a cold, cough, or fever.  · If instructed by your health care provider, bring your sleep apnea device with you on the day of your surgery (if applicable).  · Ask your health care provider if you will be going home the same day, the following day, or after a longer hospital stay.  ? Plan to have someone take you home from the hospital or clinic.  ? Plan to have a responsible adult care for you for at least 24 hours after you leave the hospital or clinic. This is important.  What happens during the procedure?  · You will be given anesthetics through both of the following:  ? A mask placed over your nose and mouth.  ? An IV in one of your veins.  · You may receive a medicine to help you relax (sedative).  · After you are unconscious, a breathing tube may be inserted down your throat to help you breathe. This will be removed before you wake up.  · An anesthesia specialist will stay with you throughout your procedure. He or she will:  ? Keep you comfortable and safe by continuing to give you medicines and adjusting the amount of medicine that you get.  ? Monitor your blood pressure, pulse, and oxygen levels to make sure that the anesthetics do not cause any problems.  The procedure may vary among health care providers and hospitals.  What happens after the procedure?  · Your blood pressure, temperature, heart rate, breathing rate, and blood oxygen level will be monitored until the medicines you were given have worn off.  · You will wake up in a recovery area. You may wake up slowly.  · If you feel anxious or agitated, you may be given medicine to help you calm down.  · If you will be going home the same day, your health care provider may check to make sure you can walk, drink, and urinate.  · Your health care  provider will treat any pain or side effects you have before you go home.  · Do not drive for 24 hours if you were given a sedative.  Summary  · General anesthesia is used to keep you still and prevent pain during a procedure.  · It is important to tell your healthcare provider about your medical history and any surgeries you have had, and previous experience with anesthesia.  · Follow your healthcare provider’s instructions about when to stop eating, drinking, or taking certain medicines before your procedure.  · Plan to have someone take you home from the hospital or clinic.  This information is not intended to replace advice given to you by your health care provider. Make sure you discuss any questions you have with your health care provider.  Document Released: 03/26/2009 Document Revised: 08/03/2018 Document Reviewed: 08/03/2018  [employer] Interactive Patient Education © 2019 [employer]       Fall Prevention in Hospitals, Adult  As a hospital patient, your condition and the treatments you receive can increase your risk for falls. Some additional risk factors for falls in a hospital include:  · Being in an unfamiliar environment.  · Being on bed rest.  · Your surgery.  · Taking certain medicines.  · Your tubing requirements, such as intravenous (IV) therapy or catheters.  It is important that you learn how to decrease fall risks while at the hospital. Below are important tips that can help prevent falls.  SAFETY TIPS FOR PREVENTING FALLS  Talk about your risk of falling.  · Ask your health care provider why you are at risk for falling. Is it your medicine, illness, tubing placement, or something else?  · Make a plan with your health care provider to keep you safe from falls.  · Ask your health care provider or pharmacist about side effects of your medicines. Some medicines can make you dizzy or affect your coordination.  Ask for help.  · Ask for help before getting out of bed. You may need to press your call  button.  · Ask for assistance in getting safely to the toilet.  · Ask for a walker or cane to be put at your bedside. Ask that most of the side rails on your bed be placed up before your health care provider leaves the room.  · Ask family or friends to sit with you.  · Ask for things that are out of your reach, such as your glasses, hearing aids, telephone, bedside table, or call button.  Follow these tips to avoid falling:  · Stay lying or seated, rather than standing, while waiting for help.  · Wear rubber-soled slippers or shoes whenever you walk in the hospital.  · Avoid quick, sudden movements.  ¨ Change positions slowly.  ¨ Sit on the side of your bed before standing.  ¨ Stand up slowly and wait before you start to walk.  · Let your health care provider know if there is a spill on the floor.  · Pay careful attention to the medical equipment, electrical cords, and tubes around you.  · When you need help, use your call button by your bed or in the bathroom. Wait for one of your health care providers to help you.  · If you feel dizzy or unsure of your footing, return to bed and wait for assistance.  · Avoid being distracted by the TV, telephone, or another person in your room.  · Do not lean or support yourself on rolling objects, such as IV poles or bedside tables.     This information is not intended to replace advice given to you by your health care provider. Make sure you discuss any questions you have with your health care provider.     Document Released: 12/15/2001 Document Revised: 01/08/2016 Document Reviewed: 08/25/2013  [employer] Interactive Patient Education ©2016 [employer]       Surgical Site Infections FAQs  What is a Surgical Site Infection (SSI)?  A surgical site infection is an infection that occurs after surgery in the part of the body where the surgery took place. Most patients who have surgery do not develop an infection. However, infections develop in about 1 to 3 out of every 100 patients  who have surgery.  Some of the common symptoms of a surgical site infection are:  · Redness and pain around the area where you had surgery  · Drainage of cloudy fluid from your surgical wound  · Fever  Can SSIs be treated?  Yes. Most surgical site infections can be treated with antibiotics. The antibiotic given to you depends on the bacteria (germs) causing the infection. Sometimes patients with SSIs also need another surgery to treat the infection.  What are some of the things that hospitals are doing to prevent SSIs?  To prevent SSIs, doctors, nurses, and other healthcare providers:  · Clean their hands and arms up to their elbows with an antiseptic agent just before the surgery.  · Clean their hands with soap and water or an alcohol-based hand rub before and after caring for each patient.  · May remove some of your hair immediately before your surgery using electric clippers if the hair is in the same area where the procedure will occur. They should not shave you with a razor.  · Wear special hair covers, masks, gowns, and gloves during surgery to keep the surgery area clean.  · Give you antibiotics before your surgery starts. In most cases, you should get antibiotics within 60 minutes before the surgery starts and the antibiotics should be stopped within 24 hours after surgery.  · Clean the skin at the site of your surgery with a special soap that kills germs.  What can I do to help prevent SSIs?  Before your surgery:  · Tell your doctor about other medical problems you may have. Health problems such as allergies, diabetes, and obesity could affect your surgery and your treatment.  · Quit smoking. Patients who smoke get more infections. Talk to your doctor about how you can quit before your surgery.  · Do not shave near where you will have surgery. Shaving with a razor can irritate your skin and make it easier to develop an infection.  At the time of your surgery:  · Speak up if someone tries to shave you with a  razor before surgery. Ask why you need to be shaved and talk with your surgeon if you have any concerns.  · Ask if you will get antibiotics before surgery.  After your surgery:  · Make sure that your healthcare providers clean their hands before examining you, either with soap and water or an alcohol-based hand rub.  · If you do not see your providers clean their hands, please ask them to do so.  · Family and friends who visit you should not touch the surgical wound or dressings.  · Family and friends should clean their hands with soap and water or an alcohol-based hand rub before and after visiting you. If you do not see them clean their hands, ask them to clean their hands.  What do I need to do when I go home from the hospital?  · Before you go home, your doctor or nurse should explain everything you need to know about taking care of your wound. Make sure you understand how to care for your wound before you leave the hospital.  · Always clean your hands before and after caring for your wound.  · Before you go home, make sure you know who to contact if you have questions or problems after you get home.  · If you have any symptoms of an infection, such as redness and pain at the surgery site, drainage, or fever, call your doctor immediately.  If you have additional questions, please ask your doctor or nurse.  Developed and co-sponsored by The Society for Healthcare Epidemiology of [place] (SHEA); Infectious Diseases Society of [place] (IDSA); American Hospital Association; Association for Professionals in Infection Control and Epidemiology (APIC); Centers for Disease Control and Prevention (CDC); and The Joint Commission.     This information is not intended to replace advice given to you by your health care provider. Make sure you discuss any questions you have with your health care provider.     Document Released: 12/23/2014 Document Revised: 01/08/2016 Document Reviewed: 03/02/2016  [employer] Interactive Patient  Education ©2016 [employer]           [facility]  CHG 4% Patient Instruction Sheet    Chlorhexidine Before Surgery  Chlorhexidine gluconate (CHG) is a germ-killing (antiseptic) solution that is used to clean the skin. It gets rid of the bacteria that normally live on the skin. Cleaning your skin with CHG before surgery helps lower the risk for infection after surgery.    How to use CHG solution  · You will take 2 showers, one shower the night before surgery, the second shower the morning of surgery before coming to the hospital.  · Use CHG only as told by your health care provider, and follow the instructions on the label.  · Use CHG solution while taking a shower. Follow these steps when using CHG solution (unless your health care provider gives you different instructions):  1. Start the shower.  2. Use your normal soap and shampoo to wash your face and hair.  3. Turn off the shower or move out of the shower stream.  4. Pour the CHG onto a clean washcloth. Do not use any type of brush or rough-edged sponge.  5. Starting at your neck, lather your body down to your toes. Make sure you:  6. Pay special attention to the part of your body where you will be having surgery. Scrub this area for at least 1 minute.  7. Use the full amount of CHG as directed. Usually, this is one half bottle for each shower.  8. Do not use CHG on your head or face. If the solution gets into your ears or eyes, rinse them well with water.  9. Avoid your genital area.  10. Avoid any areas of skin that have broken skin, cuts, or scrapes.  11. Scrub your back and under your arms. Make sure to wash skin folds.  12. Let the lather sit on your skin for 1-2 minutes or as long as told by your health care  provider.  13. Thoroughly rinse your entire body in the shower. Make sure that all body creases and crevices are rinsed well.  14. Dry off with a clean towel. Do not put any substances on your body afterward, such as powder, lotion, or  perfume.  15. Put on clean clothes or pajamas.  16. If it is the night before your surgery, sleep in clean sheets.    What are the risks?  Risks of using CHG include:  · A skin reaction.  · Hearing loss, if CHG gets in your ears.  · Eye injury, if CHG gets in your eyes and is not rinsed out.  · The CHG product catching fire.  Make sure that you avoid smoking and flames after applying CHG to your skin.  Do not use CHG:  · If you have a chlorhexidine allergy or have previously reacted to chlorhexidine.  · On babies younger than 2 months of age.      On the day of surgery, when you are taken to your room in Outpatient Surgery you will be given a CHG prepackaged cloth to wipe the site for your surgery.  How to use CHG prepackaged cloths  · Follow the instructions on the label.  · Use the CHG cloth on clean, dry skin. Follow these steps when using a CHG cloth (unless your health care provider gives you different instructions):  1. Using the CHG cloth, vigorously scrub the part of your body where you will be having surgery. Scrub using a back-and-forth motion for 3 minutes. The area on your body should be completely wet with CHG when you are finished scrubbing.  2. Do not rinse. Discard the cloth and let the area air-dry for 1 minute. Do not put any substances on your body afterward, such as powder, lotion, or perfume.  Contact a health care provider if:  · Your skin gets irritated after scrubbing.  · You have questions about using your solution or cloth.  Get help right away if:  · Your eyes become very red or swollen.  · Your eyes itch badly.  · Your skin itches badly and is red or swollen.  · Your hearing changes.  · You have trouble seeing.  · You have swelling or tingling in your mouth or throat.  · You have trouble breathing.  · You swallow any chlorhexidine.  Summary  · Chlorhexidine gluconate (CHG) is a germ-killing (antiseptic) solution that is used to clean the skin. Cleaning your skin with CHG before surgery  helps lower the risk for infection after surgery.  · You may be given CHG to use at home. It may be in a bottle or in a prepackaged cloth to use on your skin. Carefully follow your health care provider's instructions and the instructions on the product label.  · Do not use CHG if you have a chlorhexidine allergy.  · Contact your health care provider if your skin gets irritated after scrubbing.  This information is not intended to replace advice given to you by your health care provider. Make sure you discuss any questions you have with your health care provider.  Document Released: 09/11/2013 Document Revised: 11/15/2018 Document Reviewed: 11/15/2018  [employer] Interactive Patient Education © 2019 [employer]          PATIENT/FAMILY/RESPONSIBLE PARTY VERBALIZES UNDERSTANDING OF ABOVE EDUCATION.  COPY OF PAIN SCALE GIVEN AND REVIEWED WITH VERBALIZED UNDERSTANDING.

## 2020-01-04 DIAGNOSIS — R20.2 NUMBNESS AND TINGLING OF RIGHT UPPER EXTREMITY: Primary | ICD-10-CM

## 2020-01-04 DIAGNOSIS — R20.0 NUMBNESS AND TINGLING OF RIGHT UPPER EXTREMITY: Primary | ICD-10-CM

## 2020-01-04 LAB — BACTERIA SPEC AEROBE CULT: NORMAL

## 2020-01-06 RX ORDER — TIZANIDINE 4 MG/1
TABLET ORAL
Qty: 90 TABLET | Refills: 0 | Status: SHIPPED | OUTPATIENT
Start: 2020-01-06 | End: 2020-02-03

## 2020-01-20 ENCOUNTER — ANESTHESIA (OUTPATIENT)
Dept: PERIOP | Facility: HOSPITAL | Age: 40
End: 2020-01-20

## 2020-01-20 ENCOUNTER — HOSPITAL ENCOUNTER (INPATIENT)
Facility: HOSPITAL | Age: 40
LOS: 1 days | Discharge: HOME | End: 2020-01-21
Attending: NEUROLOGICAL SURGERY | Admitting: NEUROLOGICAL SURGERY

## 2020-01-20 ENCOUNTER — APPOINTMENT (OUTPATIENT)
Dept: GENERAL RADIOLOGY | Facility: HOSPITAL | Age: 40
End: 2020-01-20

## 2020-01-20 ENCOUNTER — ANESTHESIA EVENT (OUTPATIENT)
Dept: PERIOP | Facility: HOSPITAL | Age: 40
End: 2020-01-20

## 2020-01-20 DIAGNOSIS — M50.20 DISPLACEMENT OF CERVICAL INTERVERTEBRAL DISC WITHOUT MYELOPATHY: ICD-10-CM

## 2020-01-20 LAB
ABO GROUP BLD: NORMAL
B-HCG UR QL: NEGATIVE
BLD GP AB SCN SERPL QL: NEGATIVE
RH BLD: POSITIVE
T&S EXPIRATION DATE: NORMAL

## 2020-01-20 PROCEDURE — 25010000002 ONDANSETRON PER 1 MG: Performed by: NURSE PRACTITIONER

## 2020-01-20 PROCEDURE — 88311 DECALCIFY TISSUE: CPT | Performed by: NEUROLOGICAL SURGERY

## 2020-01-20 PROCEDURE — 86900 BLOOD TYPING SEROLOGIC ABO: CPT | Performed by: NEUROLOGICAL SURGERY

## 2020-01-20 PROCEDURE — S0260 H&P FOR SURGERY: HCPCS | Performed by: NEUROLOGICAL SURGERY

## 2020-01-20 PROCEDURE — 72040 X-RAY EXAM NECK SPINE 2-3 VW: CPT

## 2020-01-20 PROCEDURE — C1713 ANCHOR/SCREW BN/BN,TIS/BN: HCPCS | Performed by: NEUROLOGICAL SURGERY

## 2020-01-20 PROCEDURE — 97161 PT EVAL LOW COMPLEX 20 MIN: CPT | Performed by: PHYSICAL THERAPIST

## 2020-01-20 PROCEDURE — 0RG10K0 FUSION OF CERVICAL VERTEBRAL JOINT WITH NONAUTOLOGOUS TISSUE SUBSTITUTE, ANTERIOR APPROACH, ANTERIOR COLUMN, OPEN APPROACH: ICD-10-PCS | Performed by: NEUROLOGICAL SURGERY

## 2020-01-20 PROCEDURE — 22551 ARTHRD ANT NTRBDY CERVICAL: CPT | Performed by: NEUROLOGICAL SURGERY

## 2020-01-20 PROCEDURE — 0RB30ZZ EXCISION OF CERVICAL VERTEBRAL DISC, OPEN APPROACH: ICD-10-PCS | Performed by: NEUROLOGICAL SURGERY

## 2020-01-20 PROCEDURE — 88304 TISSUE EXAM BY PATHOLOGIST: CPT | Performed by: NEUROLOGICAL SURGERY

## 2020-01-20 PROCEDURE — 94799 UNLISTED PULMONARY SVC/PX: CPT

## 2020-01-20 PROCEDURE — 76000 FLUOROSCOPY <1 HR PHYS/QHP: CPT

## 2020-01-20 PROCEDURE — 25010000002 PROPOFOL 10 MG/ML EMULSION: Performed by: NURSE ANESTHETIST, CERTIFIED REGISTERED

## 2020-01-20 PROCEDURE — L0174 CERV SR 2PC THOR EXT PRE OTS: HCPCS | Performed by: NEUROLOGICAL SURGERY

## 2020-01-20 PROCEDURE — 25010000002 MIDAZOLAM PER 1 MG: Performed by: NURSE ANESTHETIST, CERTIFIED REGISTERED

## 2020-01-20 PROCEDURE — 97165 OT EVAL LOW COMPLEX 30 MIN: CPT | Performed by: OCCUPATIONAL THERAPIST

## 2020-01-20 PROCEDURE — 4A11X4G MONITORING OF PERIPHERAL NERVOUS ELECTRICAL ACTIVITY, INTRAOPERATIVE, EXTERNAL APPROACH: ICD-10-PCS | Performed by: NEUROLOGICAL SURGERY

## 2020-01-20 PROCEDURE — 25010000002 PROMETHAZINE PER 50 MG: Performed by: NURSE PRACTITIONER

## 2020-01-20 PROCEDURE — 20930 SP BONE ALGRFT MORSEL ADD-ON: CPT | Performed by: NEUROLOGICAL SURGERY

## 2020-01-20 PROCEDURE — 86850 RBC ANTIBODY SCREEN: CPT | Performed by: NEUROLOGICAL SURGERY

## 2020-01-20 PROCEDURE — 81025 URINE PREGNANCY TEST: CPT | Performed by: NEUROLOGICAL SURGERY

## 2020-01-20 PROCEDURE — 25010000002 CEFAZOLIN PER 500 MG: Performed by: NURSE PRACTITIONER

## 2020-01-20 PROCEDURE — 86901 BLOOD TYPING SEROLOGIC RH(D): CPT | Performed by: NEUROLOGICAL SURGERY

## 2020-01-20 PROCEDURE — 22853 INSJ BIOMECHANICAL DEVICE: CPT | Performed by: NEUROLOGICAL SURGERY

## 2020-01-20 DEVICE — SCREW PK2 G6623515 SA S-D 3.5MM X 15MM
Type: IMPLANTABLE DEVICE | Status: FUNCTIONAL
Brand: DIVERGENCE® ANTERIOR CERVICAL FUSION SYSTEM

## 2020-01-20 DEVICE — DBM T43103 2.5CC GRAFTON PUTTY
Type: IMPLANTABLE DEVICE | Status: FUNCTIONAL
Brand: GRAFTON®AND GRAFTON PLUS®DEMINERALIZED BONE MATRIX (DBM)

## 2020-01-20 DEVICE — IMPLANT G6626526 NP LORDO 6MMX15MMX12MM
Type: IMPLANTABLE DEVICE | Status: FUNCTIONAL
Brand: DIVERGENCE® ANTERIOR CERVICAL FUSION SYSTEM

## 2020-01-20 RX ORDER — SODIUM CHLORIDE, SODIUM LACTATE, POTASSIUM CHLORIDE, CALCIUM CHLORIDE 600; 310; 30; 20 MG/100ML; MG/100ML; MG/100ML; MG/100ML
100 INJECTION, SOLUTION INTRAVENOUS CONTINUOUS
Status: DISCONTINUED | OUTPATIENT
Start: 2020-01-20 | End: 2020-01-20 | Stop reason: HOSPADM

## 2020-01-20 RX ORDER — NALOXONE HCL 0.4 MG/ML
0.04 VIAL (ML) INJECTION AS NEEDED
Status: DISCONTINUED | OUTPATIENT
Start: 2020-01-20 | End: 2020-01-20 | Stop reason: HOSPADM

## 2020-01-20 RX ORDER — ACETAMINOPHEN 325 MG/1
650 TABLET ORAL EVERY 4 HOURS PRN
Status: DISCONTINUED | OUTPATIENT
Start: 2020-01-20 | End: 2020-01-21 | Stop reason: HOSPADM

## 2020-01-20 RX ORDER — BUPIVACAINE HYDROCHLORIDE AND EPINEPHRINE 2.5; 5 MG/ML; UG/ML
INJECTION, SOLUTION EPIDURAL; INFILTRATION; INTRACAUDAL; PERINEURAL AS NEEDED
Status: DISCONTINUED | OUTPATIENT
Start: 2020-01-20 | End: 2020-01-20 | Stop reason: HOSPADM

## 2020-01-20 RX ORDER — HYDROCODONE BITARTRATE AND ACETAMINOPHEN 7.5; 325 MG/1; MG/1
1 TABLET ORAL EVERY 4 HOURS PRN
Status: DISCONTINUED | OUTPATIENT
Start: 2020-01-20 | End: 2020-01-21 | Stop reason: HOSPADM

## 2020-01-20 RX ORDER — MAGNESIUM HYDROXIDE 1200 MG/15ML
LIQUID ORAL AS NEEDED
Status: DISCONTINUED | OUTPATIENT
Start: 2020-01-20 | End: 2020-01-20 | Stop reason: HOSPADM

## 2020-01-20 RX ORDER — ACETAMINOPHEN 500 MG
1000 TABLET ORAL ONCE
Status: COMPLETED | OUTPATIENT
Start: 2020-01-20 | End: 2020-01-20

## 2020-01-20 RX ORDER — KETAMINE HYDROCHLORIDE 50 MG/ML
INJECTION, SOLUTION, CONCENTRATE INTRAMUSCULAR; INTRAVENOUS AS NEEDED
Status: DISCONTINUED | OUTPATIENT
Start: 2020-01-20 | End: 2020-01-20 | Stop reason: SURG

## 2020-01-20 RX ORDER — SODIUM CHLORIDE, SODIUM LACTATE, POTASSIUM CHLORIDE, CALCIUM CHLORIDE 600; 310; 30; 20 MG/100ML; MG/100ML; MG/100ML; MG/100ML
1000 INJECTION, SOLUTION INTRAVENOUS CONTINUOUS
Status: DISCONTINUED | OUTPATIENT
Start: 2020-01-20 | End: 2020-01-20 | Stop reason: HOSPADM

## 2020-01-20 RX ORDER — PROMETHAZINE HYDROCHLORIDE 25 MG/ML
12.5 INJECTION, SOLUTION INTRAMUSCULAR; INTRAVENOUS EVERY 6 HOURS PRN
Status: DISCONTINUED | OUTPATIENT
Start: 2020-01-20 | End: 2020-01-21 | Stop reason: HOSPADM

## 2020-01-20 RX ORDER — METOCLOPRAMIDE HYDROCHLORIDE 5 MG/ML
5 INJECTION INTRAMUSCULAR; INTRAVENOUS
Status: DISCONTINUED | OUTPATIENT
Start: 2020-01-20 | End: 2020-01-20 | Stop reason: HOSPADM

## 2020-01-20 RX ORDER — MIDAZOLAM HYDROCHLORIDE 1 MG/ML
1 INJECTION INTRAMUSCULAR; INTRAVENOUS
Status: DISCONTINUED | OUTPATIENT
Start: 2020-01-20 | End: 2020-01-20 | Stop reason: HOSPADM

## 2020-01-20 RX ORDER — AMOXICILLIN 250 MG
1 CAPSULE ORAL NIGHTLY PRN
Status: DISCONTINUED | OUTPATIENT
Start: 2020-01-20 | End: 2020-01-21 | Stop reason: HOSPADM

## 2020-01-20 RX ORDER — MIDAZOLAM HYDROCHLORIDE 1 MG/ML
2 INJECTION INTRAMUSCULAR; INTRAVENOUS
Status: DISCONTINUED | OUTPATIENT
Start: 2020-01-20 | End: 2020-01-20 | Stop reason: HOSPADM

## 2020-01-20 RX ORDER — FLUMAZENIL 0.1 MG/ML
0.2 INJECTION INTRAVENOUS AS NEEDED
Status: DISCONTINUED | OUTPATIENT
Start: 2020-01-20 | End: 2020-01-20 | Stop reason: HOSPADM

## 2020-01-20 RX ORDER — SODIUM CHLORIDE 0.9 % (FLUSH) 0.9 %
10 SYRINGE (ML) INJECTION AS NEEDED
Status: DISCONTINUED | OUTPATIENT
Start: 2020-01-20 | End: 2020-01-20 | Stop reason: HOSPADM

## 2020-01-20 RX ORDER — DOCUSATE SODIUM 100 MG/1
100 CAPSULE, LIQUID FILLED ORAL 2 TIMES DAILY PRN
Status: DISCONTINUED | OUTPATIENT
Start: 2020-01-20 | End: 2020-01-21 | Stop reason: HOSPADM

## 2020-01-20 RX ORDER — NALOXONE HCL 0.4 MG/ML
0.4 VIAL (ML) INJECTION
Status: DISCONTINUED | OUTPATIENT
Start: 2020-01-20 | End: 2020-01-21 | Stop reason: HOSPADM

## 2020-01-20 RX ORDER — SODIUM CHLORIDE 0.9 % (FLUSH) 0.9 %
3 SYRINGE (ML) INJECTION EVERY 12 HOURS SCHEDULED
Status: DISCONTINUED | OUTPATIENT
Start: 2020-01-20 | End: 2020-01-21 | Stop reason: HOSPADM

## 2020-01-20 RX ORDER — LIDOCAINE HYDROCHLORIDE 10 MG/ML
0.5 INJECTION, SOLUTION EPIDURAL; INFILTRATION; INTRACAUDAL; PERINEURAL ONCE AS NEEDED
Status: DISCONTINUED | OUTPATIENT
Start: 2020-01-20 | End: 2020-01-20 | Stop reason: HOSPADM

## 2020-01-20 RX ORDER — SODIUM CHLORIDE 0.9 % (FLUSH) 0.9 %
3 SYRINGE (ML) INJECTION EVERY 12 HOURS SCHEDULED
Status: DISCONTINUED | OUTPATIENT
Start: 2020-01-20 | End: 2020-01-20 | Stop reason: HOSPADM

## 2020-01-20 RX ORDER — SUFENTANIL CITRATE 50 UG/ML
INJECTION EPIDURAL; INTRAVENOUS AS NEEDED
Status: DISCONTINUED | OUTPATIENT
Start: 2020-01-20 | End: 2020-01-20 | Stop reason: SURG

## 2020-01-20 RX ORDER — MORPHINE SULFATE 2 MG/ML
2 INJECTION, SOLUTION INTRAMUSCULAR; INTRAVENOUS
Status: DISCONTINUED | OUTPATIENT
Start: 2020-01-20 | End: 2020-01-20 | Stop reason: HOSPADM

## 2020-01-20 RX ORDER — IPRATROPIUM BROMIDE AND ALBUTEROL SULFATE 2.5; .5 MG/3ML; MG/3ML
3 SOLUTION RESPIRATORY (INHALATION) ONCE AS NEEDED
Status: DISCONTINUED | OUTPATIENT
Start: 2020-01-20 | End: 2020-01-20 | Stop reason: HOSPADM

## 2020-01-20 RX ORDER — ONDANSETRON 2 MG/ML
4 INJECTION INTRAMUSCULAR; INTRAVENOUS AS NEEDED
Status: DISCONTINUED | OUTPATIENT
Start: 2020-01-20 | End: 2020-01-20 | Stop reason: HOSPADM

## 2020-01-20 RX ORDER — CLONIDINE HYDROCHLORIDE 0.1 MG/1
0.1 TABLET ORAL ONCE
Status: COMPLETED | OUTPATIENT
Start: 2020-01-20 | End: 2020-01-20

## 2020-01-20 RX ORDER — FENTANYL CITRATE 50 UG/ML
25 INJECTION, SOLUTION INTRAMUSCULAR; INTRAVENOUS AS NEEDED
Status: DISCONTINUED | OUTPATIENT
Start: 2020-01-20 | End: 2020-01-20 | Stop reason: HOSPADM

## 2020-01-20 RX ORDER — HYDRALAZINE HYDROCHLORIDE 20 MG/ML
5 INJECTION INTRAMUSCULAR; INTRAVENOUS
Status: DISCONTINUED | OUTPATIENT
Start: 2020-01-20 | End: 2020-01-20 | Stop reason: HOSPADM

## 2020-01-20 RX ORDER — LIDOCAINE HYDROCHLORIDE 20 MG/ML
INJECTION, SOLUTION INFILTRATION; PERINEURAL AS NEEDED
Status: DISCONTINUED | OUTPATIENT
Start: 2020-01-20 | End: 2020-01-20 | Stop reason: SURG

## 2020-01-20 RX ORDER — CEFAZOLIN SODIUM IN 0.9 % NACL 3 G/100 ML
3 INTRAVENOUS SOLUTION, PIGGYBACK (ML) INTRAVENOUS EVERY 8 HOURS
Status: COMPLETED | OUTPATIENT
Start: 2020-01-20 | End: 2020-01-21

## 2020-01-20 RX ORDER — PROPOFOL 10 MG/ML
VIAL (ML) INTRAVENOUS AS NEEDED
Status: DISCONTINUED | OUTPATIENT
Start: 2020-01-20 | End: 2020-01-20 | Stop reason: SURG

## 2020-01-20 RX ORDER — ONDANSETRON 2 MG/ML
4 INJECTION INTRAMUSCULAR; INTRAVENOUS EVERY 6 HOURS PRN
Status: DISCONTINUED | OUTPATIENT
Start: 2020-01-20 | End: 2020-01-21 | Stop reason: HOSPADM

## 2020-01-20 RX ORDER — SODIUM CHLORIDE 0.9 % (FLUSH) 0.9 %
3 SYRINGE (ML) INJECTION AS NEEDED
Status: DISCONTINUED | OUTPATIENT
Start: 2020-01-20 | End: 2020-01-20 | Stop reason: HOSPADM

## 2020-01-20 RX ORDER — MORPHINE SULFATE 2 MG/ML
1 INJECTION, SOLUTION INTRAMUSCULAR; INTRAVENOUS EVERY 4 HOURS PRN
Status: DISCONTINUED | OUTPATIENT
Start: 2020-01-20 | End: 2020-01-21 | Stop reason: HOSPADM

## 2020-01-20 RX ORDER — SODIUM CHLORIDE 0.9 % (FLUSH) 0.9 %
10 SYRINGE (ML) INJECTION AS NEEDED
Status: DISCONTINUED | OUTPATIENT
Start: 2020-01-20 | End: 2020-01-21 | Stop reason: HOSPADM

## 2020-01-20 RX ORDER — BUPIVACAINE HCL/0.9 % NACL/PF 0.1 %
2 PLASTIC BAG, INJECTION (ML) EPIDURAL ONCE
Status: COMPLETED | OUTPATIENT
Start: 2020-01-20 | End: 2020-01-20

## 2020-01-20 RX ORDER — OXYCODONE AND ACETAMINOPHEN 10; 325 MG/1; MG/1
1 TABLET ORAL ONCE AS NEEDED
Status: DISCONTINUED | OUTPATIENT
Start: 2020-01-20 | End: 2020-01-20 | Stop reason: HOSPADM

## 2020-01-20 RX ORDER — SODIUM CHLORIDE 9 MG/ML
75 INJECTION, SOLUTION INTRAVENOUS CONTINUOUS
Status: DISCONTINUED | OUTPATIENT
Start: 2020-01-20 | End: 2020-01-21 | Stop reason: HOSPADM

## 2020-01-20 RX ORDER — BISACODYL 10 MG
10 SUPPOSITORY, RECTAL RECTAL DAILY PRN
Status: DISCONTINUED | OUTPATIENT
Start: 2020-01-20 | End: 2020-01-21 | Stop reason: HOSPADM

## 2020-01-20 RX ORDER — LABETALOL HYDROCHLORIDE 5 MG/ML
5 INJECTION, SOLUTION INTRAVENOUS
Status: DISCONTINUED | OUTPATIENT
Start: 2020-01-20 | End: 2020-01-20 | Stop reason: HOSPADM

## 2020-01-20 RX ORDER — SODIUM CHLORIDE 0.9 % (FLUSH) 0.9 %
3-10 SYRINGE (ML) INJECTION AS NEEDED
Status: DISCONTINUED | OUTPATIENT
Start: 2020-01-20 | End: 2020-01-20 | Stop reason: HOSPADM

## 2020-01-20 RX ADMIN — ACETAMINOPHEN 1000 MG: 500 TABLET, FILM COATED ORAL at 06:54

## 2020-01-20 RX ADMIN — PROPOFOL 200 MG: 10 INJECTION, EMULSION INTRAVENOUS at 07:45

## 2020-01-20 RX ADMIN — CLONIDINE HYDROCHLORIDE 0.1 MG: 0.1 TABLET ORAL at 22:23

## 2020-01-20 RX ADMIN — SODIUM CHLORIDE, PRESERVATIVE FREE 3 ML: 5 INJECTION INTRAVENOUS at 20:32

## 2020-01-20 RX ADMIN — LIDOCAINE HYDROCHLORIDE 200 MG: 20 INJECTION, SOLUTION INFILTRATION; PERINEURAL at 07:45

## 2020-01-20 RX ADMIN — KETAMINE HYDROCHLORIDE 100 MG: 50 INJECTION, SOLUTION INTRAMUSCULAR; INTRAVENOUS at 08:53

## 2020-01-20 RX ADMIN — SODIUM CHLORIDE 75 ML/HR: 9 INJECTION, SOLUTION INTRAVENOUS at 11:00

## 2020-01-20 RX ADMIN — SODIUM CHLORIDE, POTASSIUM CHLORIDE, SODIUM LACTATE AND CALCIUM CHLORIDE 1000 ML: 600; 310; 30; 20 INJECTION, SOLUTION INTRAVENOUS at 05:52

## 2020-01-20 RX ADMIN — MIDAZOLAM 2 MG: 1 INJECTION INTRAMUSCULAR; INTRAVENOUS at 06:54

## 2020-01-20 RX ADMIN — SODIUM CHLORIDE, PRESERVATIVE FREE 3 ML: 5 INJECTION INTRAVENOUS at 11:00

## 2020-01-20 RX ADMIN — PROPOFOL: 10 INJECTION, EMULSION INTRAVENOUS at 08:53

## 2020-01-20 RX ADMIN — ONDANSETRON HYDROCHLORIDE 4 MG: 2 SOLUTION INTRAMUSCULAR; INTRAVENOUS at 11:48

## 2020-01-20 RX ADMIN — SODIUM CHLORIDE, POTASSIUM CHLORIDE, SODIUM LACTATE AND CALCIUM CHLORIDE 1000 ML: 600; 310; 30; 20 INJECTION, SOLUTION INTRAVENOUS at 05:59

## 2020-01-20 RX ADMIN — KETAMINE HYDROCHLORIDE 100 MG: 50 INJECTION, SOLUTION INTRAMUSCULAR; INTRAVENOUS at 07:45

## 2020-01-20 RX ADMIN — PROMETHAZINE HYDROCHLORIDE 12.5 MG: 25 INJECTION INTRAMUSCULAR; INTRAVENOUS at 15:07

## 2020-01-20 RX ADMIN — SUFENTANIL CITRATE 100 MCG: 50 INJECTION EPIDURAL; INTRAVENOUS at 07:45

## 2020-01-20 RX ADMIN — Medication 2 G: at 07:42

## 2020-01-20 RX ADMIN — SODIUM CHLORIDE, POTASSIUM CHLORIDE, SODIUM LACTATE AND CALCIUM CHLORIDE: 600; 310; 30; 20 INJECTION, SOLUTION INTRAVENOUS at 07:42

## 2020-01-20 RX ADMIN — PROPOFOL 100 MCG/KG/MIN: 10 INJECTION, EMULSION INTRAVENOUS at 07:45

## 2020-01-20 RX ADMIN — SODIUM CHLORIDE 3 G: 9 INJECTION, SOLUTION INTRAVENOUS at 15:07

## 2020-01-20 RX ADMIN — LIDOCAINE HYDROCHLORIDE 200 MG: 20 INJECTION, SOLUTION INFILTRATION; PERINEURAL at 08:55

## 2020-01-20 NOTE — OP NOTE
Procedure Note  Preop Diagnosis: Displacement of cervical intervertebral disc without myelopathy [M50.20]    Post-Op Diagnosis Codes:     * Displacement of cervical intervertebral disc without myelopathy [M50.20]     Procedure Name:C5/6 anterior discectomy  C5/6 anterior interbody fusion with allograft  C5/6 partial corpectomies less than 50%  C5/6 anterior instrumented fusion  Using the operative microscope          Indications:  A MRI revealed findings of Cervical Radiculopathy . The patient now presents for anterior cervical discectomy and fusion of C5/6 after discussing therapeutic alternatives.          Surgeon: John Vernon MD     Assistants: none    Anesthesia: General endotracheal anesthesia    ASA Class: 3    Procedure Details   After obtaining informed consent, having the risks and benefits of the procedure explained including but not limited to infection, bleeding, paralysis, spinal fluid leak, speech and swallow difficulty, stroke, coma, and death, the patient was brought to the operating room.  The patient was given general anesthesia via an endotracheal tube.  The patient was laid supine on the operating table.  The patient was placed in an occipital mandibular head harness and 5 pounds of axial traction.  A preplanned incision in the right anterior portion of the neck was marked with indelible marker.  Fluoroscopy confirmed the correct level of C5/6.  The patient was then prepped and draped in a standard sterile fashion.  The preplanned incision was infiltrated with Marcaine and epinephrine.  A 10 blade scalpel was used to make an incision to the dermis and epidermis.  Bovie cautery was used to extend the incision down to the subcutaneous and soft tissues to the level of the platysma.  The platysma was then split longitudinally using Metzenbaum scissors.  The connective tissue plane lateral to the esophagus and trachea medial to the carotid artery was then developed using blunt and sharp  dissection.  Once the anterior portion of the vertebral bodies were identified a bent spinal needle was placed into the disc space at C5/6.  Fluoroscopy confirmed this was the correct level.  The longus colli were then dissected off the anterior lateral portions of the vertebral bodies using Bovie cautery.  The Shadow-Line retractor system was then placed into the wound.  At this point the operative microscope was brought in.  Discectomies at each level were then conducted using a 15 blade scalpel to incise the annulus then a series of up-biting curettes, pituitary rongeurs and a 5 mm barrel bit were used.  Once the discectomy was completed the posterior longitudinal ligament at each level was identified. It was bluntly dissected using a nerve hook.  The PLL was then incised using a 1 mm Kerrison.  Bilateral foraminotomies at each level were then conducted using a 1.5 mm Kerrison.  Partial corpectomies of the posterior portion of the vertebral bodies of C5/6 were then done using a 2 mm Kerrison.  Once this was completed a 6 mm peek interbody spacer was tapped into place at each level.  The spacers were filled with allograft.  A series of  15 mm self drilling self-tapping screws were placed into the vertebral bodies.  Final AP and lateral fluoroscopy showed good position of all interbody devices and hardware.  The wound was then copiously irrigated with antibiotic solution.  The wound was inspected for hemostasis.  The subcutaneous tissues were then closed using a series of inverted interrupted 2-0 Vicryl sutures.  The skin was closed using running 4-0 Monocryl subcuticular.  All sponge, needle and instrument counts were correct at the end of the procedure.  The patient was extubated in stable condition and returned to recovery room with about 50 mL of blood loss.        Findings:  Cervical Radiculopathy    Estimated Blood Loss:  50           Drains: none           Total IV Fluids: ml           Specimens:   Specimens      ID Source Type Tests Collected By Collected At Frozen?      A Spine, Cervical Disc · TISSUE PATHOLOGY EXAM   John Vernon MD 1/20/20 0809 No     Description: DISC C5-6                 Implants:   Implant Name Type Inv. Item Serial No.  Lot No. LRB No. Used   PUTTY DBM FATIMAH 2.5CC - [id number] - [id number] Implant PUTTY DBM FATIMAH 2.5CC [id number] [employer]  N/A 1   CAGE DIVERGENCE STANDALONE LRD [id number] - [id number] Implant CAGE DIVERGENCE STANDALONE LRD [id number]  [employer] [id number] N/A 1   SCRW SD SA 3.5X15MM PK/2 - [id number] Implant SCRW SD SA 3.5X15MM PK/2  [employer] [id number] N/A 1              Complications:  none           Disposition: PACU - hemodynamically stable.           Condition: stable        John Vernon MD

## 2020-01-20 NOTE — PLAN OF CARE
Pt A&Ox4. VSS. No c/o pain this shift. Dressing to neck CDI. Aspen collar in place. Pt c/o nausea and vomiting, medicated with PRN antiemetic with relief. Post op antibiotics given.  Up x1 to bathroom. Safety maintained, will continue to monitor.   Problem: Patient Care Overview  Goal: Individualization and Mutuality  Outcome: Ongoing (interventions implemented as appropriate)  Flowsheets (Taken 1/20/2020 1128)  Patient Specific Goals (Include Timeframe): Decrease pain by DC  Patient Specific Interventions: q 4 neuro checks  Patient Specific Preferences: prefers to lay on L side

## 2020-01-20 NOTE — ANESTHESIA PREPROCEDURE EVALUATION
Anesthesia Evaluation     Patient summary reviewed and Nursing notes reviewed   NPO Solid Status: > 8 hours  NPO Liquid Status: > 8 hours           Airway   Mallampati: II  TM distance: >3 FB  Neck ROM: full  No difficulty expected  Dental          Pulmonary - negative pulmonary ROS   Cardiovascular - negative cardio ROS  Exercise tolerance: good (4-7 METS)    Rhythm: regular  Rate: normal        Neuro/Psych- negative ROS  GI/Hepatic/Renal/Endo    (+) obesity, morbid obesity,      Musculoskeletal (-) negative ROS    Abdominal  - normal exam   Substance History - negative use     OB/GYN negative ob/gyn ROS         Other - negative ROS                       Anesthesia Plan    ASA 3     general   total IV anesthesia  intravenous induction     Anesthetic plan, all risks, benefits, and alternatives have been provided, discussed and informed consent has been obtained with: patient.

## 2020-01-20 NOTE — ANESTHESIA PROCEDURE NOTES
Airway  Urgency: elective    Date/Time: 1/20/2020 7:45 AM  Airway not difficult    General Information and Staff    Patient location during procedure: OR  CRNA: Oscar Craven CRNA    Indications and Patient Condition  Indications for airway management: airway protection    Preoxygenated: yes  MILS maintained throughout  Mask difficulty assessment: 1 - vent by mask    Final Airway Details  Final airway type: endotracheal airway      Successful airway: ETT and NIM tube  Cuffed: yes   Successful intubation technique: direct laryngoscopy  Endotracheal tube insertion site: oral  Blade: Sanchez  Blade size: 2  ETT size (mm): 7.0  Cormack-Lehane Classification: grade I - full view of glottis  Placement verified by: chest auscultation and capnometry   Cuff volume (mL): 5  Measured from: lips  ETT/EBT  to lips (cm): 20  Number of attempts at approach: 1  Assessment: lips, teeth, and gum same as pre-op and atraumatic intubation

## 2020-01-20 NOTE — PLAN OF CARE
Problem: Patient Care Overview  Goal: Plan of Care Review  Outcome: Ongoing (interventions implemented as appropriate)  Flowsheets (Taken 1/20/2020 5931)  Progress: improving  Plan of Care Reviewed With: patient  Outcome Summary: PT evaluation completed. The patient presents lying in bed, awake, and oriented with Aspen collar in place. Pt has mi[place] J collar in the car. Currently the Aspen collar fits correctly and extra pads were given due to her vomiting on her collar. The patient presents with no sensation deficits or strength deficits. She is up independently and was educated on use of either cervical collar. Recommend discharge home with assist.

## 2020-01-20 NOTE — PLAN OF CARE
Problem: Patient Care Overview  Goal: Plan of Care Review  Outcome: Ongoing (interventions implemented as appropriate)   OT eval completed. Pt alert and oriented x4. Pt in good spirits and motivated. Pt reports vomitting. New aspen collar provided d/t vomitting on brace pads. Pt was independent for all mobility and adls assessed this date. Pt had no sensation, strength or coordination deficits noted. Pt educated on c-collar and precautions. Skilled OT not warranted d/t pt independence level. Pt reports no pain. Recommended d/c home with assist.

## 2020-01-20 NOTE — PAYOR COMM NOTE
"ADMIT IPT 1-20-20  UR  [id number]      Ada Givens (39 y.o. Female)     Date of Birth Social Security Number Address Home Phone MRN    1980  [address] [facility] [address] [phone] [id number]    Yazdanism Marital Status          Jain Legally        Admission Date Admission Type Admitting Provider Attending Provider Department, Room/Bed    1/20/20 Elective John Vernon MD Gruber, Thomas J, MD [facility] 3A, 347/1    Discharge Date Discharge Disposition Discharge Destination                       Attending Provider:  John Vernon MD    Allergies:  No Known Allergies    Isolation:  None   Infection:  None   Code Status:  CPR    Ht:  181 cm (71.26\")   Wt:  140 kg (307 lb 12.2 oz)    Admission Cmt:  None   Principal Problem:  Displacement of cervical intervertebral disc without myelopathy [M50.20]                 Active Insurance as of 1/20/2020     Primary Coverage     Payor Plan Insurance Group Employer/Plan Group    [employer] [place] [employer]      Payor Plan Address Payor Plan Phone Number Payor Plan Fax Number Effective Dates    [address] [address] [phone]  10/17/2018 - None Entered    [facility] [id number]       Subscriber Name Subscriber Birth Date Member ID       ADA GIVENS 1980 [id number]                 Emergency Contacts      (Rel.) Home Phone Work Phone Mobile Phone    Sheree Hernandez (Mother) -- -- [phone]               History & Physical      John Vernon MD at 01/20/20 0644          SUBJECTIVE:  Patient ID: Ada Givens is a 39 y.o. female is here today for follow-up.    Chief Complaint: Neck pain  No chief complaint on file.      HPI  39-year-old female that we have been following for right paraspinal neck pain, right upper extremity radicular pain along with numbness and tingling.  She is gone through an extensive course of conservative care involving physical therapy, medication, " anti-inflammatories, pain management injections.  She has had mixed results but still has a significant amount of lingering symptoms.  We sent her for repeat MRI and an EMG nerve conduction study she is here to discuss the results.    The following portions of the patient's history were reviewed and updated as appropriate: allergies, current medications, past family history, past medical history, past social history, past surgical history and problem list.    OBJECTIVE:    Review of Systems   Musculoskeletal: Positive for neck pain.   Neurological: Positive for numbness.   All other systems reviewed and are negative.         Physical Exam   Constitutional: She is oriented to person, place, and time.   Neurological: She is oriented to person, place, and time. She has normal strength. She has a normal Finger-Nose-Finger Test. Gait normal.   Reflex Scores:       Tricep reflexes are 1+ on the right side and 2+ on the left side.       Bicep reflexes are 0 on the right side and 2+ on the left side.       Brachioradialis reflexes are 0 on the right side and 2+ on the left side.  Psychiatric: Her speech is normal.       Neurologic Exam     Mental Status   Oriented to person, place, and time.   Speech: speech is normal   Level of consciousness: alert  Knowledge: good.     Cranial Nerves   Cranial nerves II through XII intact.     Motor Exam   Muscle bulk: normal  Overall muscle tone: normal  Right arm pronator drift: absent  Left arm pronator drift: absent    Strength   Strength 5/5 throughout.     Sensory Exam   Right arm light touch: decreased from elbow  Right arm pinprick: decreased from elbow    Gait, Coordination, and Reflexes     Gait  Gait: normal    Coordination   Finger to nose coordination: normal    Tremor   Resting tremor: absent  Intention tremor: absent  Action tremor: absent    Reflexes   Right brachioradialis: 0  Left brachioradialis: 2+  Right biceps: 0  Left biceps: 2+  Right triceps: 1+  Left triceps:  2+      Independent Review of Radiographic Studies:   EMG nerve conduction study is negative.  MRI of the cervical spine shows cervical disc herniation eccentric to the right at C5-6.    ASSESSMENT/PLAN:  The patient has a disc herniation at C5-6 which is responsible for her right upper extremity symptoms.  The EMG nerve conduction study was negative for any peripheral nerve entrapment.  I would recommend a single level anterior cervical fusion at this point.  The risks and benefits were discussed at length which include but were not limited to infection, bleeding, paralysis, spinal fluid leak, speech and swallow difficulty, stroke, coma, and death.  The patient acknowledged understand this.  Her questions and concerns were addressed.  We will get her prepped and schedule this soon as possible.      1. Displacement of cervical intervertebral disc without myelopathy            No follow-ups on file.      John Vernon MD            Electronically signed by John Vernon MD at 01/20/20 0644       Vital Signs (last 2 days)     Date/Time   Temp   Temp src   Pulse   Resp   BP   Patient Position   SpO2    01/20/20 1155   97.8 (36.6)   Oral   75   16   139/72   Lying   95    01/20/20 1122   97.8 (36.6)   Oral   74   16   144/75   Lying   93    01/20/20 1106   --   --   76   16   --   --   95    01/20/20 1054   97.7 (36.5)   Oral   72   12   137/82   Lying   92    01/20/20 1030   97.6 (36.4)   Temporal   75   14   140/77   --   96    01/20/20 1025   --   --   75   14   139/80   --   100    01/20/20 1015   97.5 (36.4)   Temporal   75   12   --   --   98    01/20/20 1010   --   --   74   12   141/73   --   100    01/20/20 1000   --   --   75   12   --   --   99    01/20/20 0955   --   --   76   12   140/79   --   99    01/20/20 0950   --   --   78   12   --   --   100    01/20/20 0945   --   --   78   12   138/83   --   99    01/20/20 0940   98.2 (36.8)   Temporal   78   10   147/88   Lying   99    01/20/20 0621   --    --   77   --   --   --   100    01/20/20 0543   97.9 (36.6)   Temporal   80   16   137/84   Sitting   100            Oxygen Therapy (last 2 days)     Date/Time   SpO2   Device (Oxygen Therapy)   Flow (L/min)   Oxygen Concentration (%)   ETCO2 (mmHg)    01/20/20 1155   95   room air   --   --   --    01/20/20 1122   93   room air   --   --   --    01/20/20 1106   95   room air   --   --   --    01/20/20 1054   92   room air   --   --   --    01/20/20 1030   96   --   --   --   --    01/20/20 1025   100   --   --   --   --    01/20/20 1015   98   --   --   --   --    01/20/20 1010   100   room air   --   --   --    01/20/20 1000   99   --   --   --   --    01/20/20 0955   99   --   --   --   --    01/20/20 0950   100   --   --   --   --    01/20/20 0945   99   --   --   --   --    01/20/20 0940   99   simple face mask   8   --   --    01/20/20 0621   100   room air   --   --   --    01/20/20 0543   100   room air   --   --   --            Intake & Output (last 2 days)       01/18 0701 - 01/19 0700 01/19 0701 - 01/20 0700 01/20 0701 - 01/21 0700    I.V.   1150    Total Intake   1150    Net   +1150               Lines, Drains & Airways    Active LDAs     Name:   Placement date:   Placement time:   Site:   Days:    Peripheral IV 01/20/20 0550 Left Antecubital   01/20/20    0550    Antecubital   less than 1    Peripheral IV 01/20/20 0558 Right Antecubital   01/20/20    0558    Antecubital   less than 1         Inactive LDAs     Name:   Placement date:   Placement time:   Removal date:   Removal time:   Site:   Days:    [REMOVED] ETT    01/20/20    0745 created via procedure documentation    01/20/20    0933     less than 1                  Facility-Administered Medications as of 1/20/2020   Medication Dose Route Frequency Provider Last Rate Last Dose   • [COMPLETED] acetaminophen (TYLENOL) tablet 1,000 mg  1,000 mg Oral Once Coy Vaughan, CRNA   1,000 mg at 01/20/20 0654   • acetaminophen (TYLENOL) tablet 650 mg  650  mg Oral Q4H PRN Mehul Narvaez APRN       • bisacodyl (DULCOLAX) suppository 10 mg  10 mg Rectal Daily PRN Mehul Narvaez APRN       • [COMPLETED] ceFAZolin in 0.9% normal saline (ANCEF) IVPB solution 2 g  2 g Intravenous Once John Vernon MD   2 g at 01/20/20 0742   • ceFAZolin in Sodium Chloride (ANCEF) IVPB solution 3 g  3 g Intravenous Q8H Mehul Narvaez APRN       • docusate sodium (COLACE) capsule 100 mg  100 mg Oral BID PRN Mehul Narvaez APRN       • HYDROcodone-acetaminophen (NORCO) 7.5-325 MG per tablet 1 tablet  1 tablet Oral Q4H PRN Mehul Narvaez APRN       • Morphine sulfate (PF) injection 1 mg  1 mg Intravenous Q4H PRN Mehul Narvaez APRN        And   • naloxone (NARCAN) injection 0.4 mg  0.4 mg Intravenous Q5 Min PRN Mehul Narvaez APRN       • ondansetron (ZOFRAN) injection 4 mg  4 mg Intravenous Q6H PRN Mehul Narvaez APRN   4 mg at 01/20/20 1148   • sennosides-docusate (PERICOLACE) 8.6-50 MG per tablet 1 tablet  1 tablet Oral Nightly PRN Mehul Narvaez APRN       • sodium chloride 0.9 % flush 10 mL  10 mL Intravenous PRN Mehul Narvaez APRN       • sodium chloride 0.9 % flush 3 mL  3 mL Intravenous Q12H Mehul Narvaez APRN   3 mL at 01/20/20 1100   • sodium chloride 0.9 % infusion  75 mL/hr Intravenous Continuous Mehul Narvaez APRN 75 mL/hr at 01/20/20 1100 75 mL/hr at 01/20/20 1100     Blood Administration Record (From admission, onward)    None        Lab Results (last 48 hours)     Procedure Component Value Units Date/Time    Tissue Pathology Exam [[id number]] Collected:  01/20/20 0809    Specimen:  Disc from Spine, Cervical Updated:  01/20/20 1125    Pregnancy, Urine - Urine, Clean Catch [[id number]]  (Normal) Collected:  01/20/20 0549    Specimen:  Urine, Clean Catch Updated:  01/20/20 0613     HCG, Urine QL Negative          Imaging Results (Last 48 Hours)     Procedure Component Value Units Date/Time    XR Spine Cervical 2 View  [[id number]] Resulted:  01/20/20 1157     Updated:  01/20/20 1157    FL C Arm During Surgery [[id number]] Resulted:  01/20/20 1157     Updated:  01/20/20 1157          Orders (last 48 hrs)      Start     Ordered    01/21/20 0600  Discontinue Indwelling Urinary Catheter in AM  Once     Comments:  If Patient Alert and Urine Output is Greater Than 25 mL/hr    01/20/20 1051    01/21/20 0600  CBC & Differential  Morning Draw      01/20/20 1051    01/21/20 0600  Basic Metabolic Panel  Morning Draw      01/20/20 1051    01/20/20 1600  ceFAZolin in Sodium Chloride (ANCEF) IVPB solution 3 g  Every 8 Hours      01/20/20 1051    01/20/20 1200  Vital Signs  Every 4 Hours      01/20/20 1051    01/20/20 1200  Neurovascular Checks  Every 4 Hours      01/20/20 1051    01/20/20 1200  Incentive Spirometry  Every 2 Hours While Awake      01/20/20 1051    01/20/20 1145  sodium chloride 0.9 % flush 3 mL  Every 12 Hours Scheduled      01/20/20 1051    01/20/20 1145  sodium chloride 0.9 % infusion  Continuous      01/20/20 1051    01/20/20 1052  Code Status and Medical Interventions:  Continuous      01/20/20 1051    01/20/20 1052  Ambulate Patient  Every Shift     Comments:  Ok to ambulate patient    01/20/20 1051    01/20/20 1052  Saline lock IV with adequate po intake.  Continuous      01/20/20 1051    01/20/20 1052  Continue Indwelling Urinary Catheter Already in Place  Once      01/20/20 1051    01/20/20 1052  Notify Provider if Bladder Distention Continues  Until Discontinued      01/20/20 1051    01/20/20 1052  Urinary Catheter Care  Every Shift      01/20/20 1051    01/20/20 1052  Oxygen Therapy- Nasal Cannula; Titrate for SPO2: equal to or greater than, 92%, per policy  Continuous      01/20/20 1051    01/20/20 1052  Continuous Pulse Oximetry  Continuous      01/20/20 1051    01/20/20 1052  Turn cough deep breathe  Once      01/20/20 1051    01/20/20 1052  Diet Regular  Diet Effective Now      01/20/20 1051    01/20/20 1052   Advance diet as tolerated  Until Discontinued      01/20/20 1051    01/20/20 1052  Insert Peripheral IV  Once      01/20/20 1051    01/20/20 1052  Saline Lock & Maintain IV Access  Continuous      01/20/20 1051    01/20/20 1051  Morphine sulfate (PF) injection 1 mg  Every 4 Hours PRN      01/20/20 1051    01/20/20 1051  naloxone (NARCAN) injection 0.4 mg  Every 5 Minutes PRN      01/20/20 1051    01/20/20 1051  sodium chloride 0.9 % flush 10 mL  As Needed      01/20/20 1051    01/20/20 1051  acetaminophen (TYLENOL) tablet 650 mg  Every 4 Hours PRN      01/20/20 1051    01/20/20 1051  sennosides-docusate (PERICOLACE) 8.6-50 MG per tablet 1 tablet  Nightly PRN      01/20/20 1051    01/20/20 1051  docusate sodium (COLACE) capsule 100 mg  2 Times Daily PRN      01/20/20 1051    01/20/20 1051  bisacodyl (DULCOLAX) suppository 10 mg  Daily PRN      01/20/20 1051    01/20/20 1051  ondansetron (ZOFRAN) injection 4 mg  Every 6 Hours PRN      01/20/20 1051    01/20/20 1051  HYDROcodone-acetaminophen (NORCO) 7.5-325 MG per tablet 1 tablet  Every 4 Hours PRN      01/20/20 1051    01/20/20 1012  PT Consult: Eval & Treat Full  Once     Comments:  collar on at all times    01/20/20 1013    01/20/20 1012  OT Consult: Eval & Treat  Once      01/20/20 1013    01/20/20 1012  Place Sequential Compression Device  Once      01/20/20 1013    01/20/20 1012  Maintain Sequential Compression Device  Continuous      01/20/20 1013    01/20/20 0933  Vital signs every 5 minutes for 15 minutes, every 15 minutes thereafter.  Once,   Status:  Canceled      01/20/20 0932    01/20/20 0933  Call Anesthesiologist for additional IV Fluid bolus for Hypotension/Tachycardia  Continuous,   Status:  Canceled      01/20/20 0932    01/20/20 0933  Notify Anesthesia of Any Acute Changes in Patient Condition  Until Discontinued,   Status:  Canceled      01/20/20 0932    01/20/20 0933  Notify Anesthesia for Unrelieved Pain  Until Discontinued,   Status:  Canceled       01/20/20 0932 01/20/20 0933  Once DC criteria to floor met, follow surgeon's orders.  Until Discontinued,   Status:  Canceled      01/20/20 0932 01/20/20 0933  Discharge patient from PACU when discharge criteria is met.  Until Discontinued,   Status:  Canceled      01/20/20 0932 01/20/20 0932  Apply warming blanket  As Needed,   Status:  Canceled     Comments:  For a recorded temp of <36.9 C    01/20/20 0932 01/20/20 0932  oxyCODONE-acetaminophen (PERCOCET)  MG per tablet 1 tablet  Once As Needed,   Status:  Discontinued      01/20/20 0932 01/20/20 0932  Morphine sulfate (PF) injection 2 mg  Every 10 Minutes PRN,   Status:  Discontinued      01/20/20 0932 01/20/20 0932  labetalol (NORMODYNE,TRANDATE) injection 5 mg  Every 5 Minutes PRN,   Status:  Discontinued      01/20/20 0932 01/20/20 0932  hydrALAZINE (APRESOLINE) injection 5 mg  Every 10 Minutes PRN,   Status:  Discontinued      01/20/20 0932 01/20/20 0932  ipratropium-albuterol (DUO-NEB) nebulizer solution 3 mL  Once As Needed,   Status:  Discontinued      01/20/20 0932 01/20/20 0932  naloxone (NARCAN) injection 0.04 mg  As Needed,   Status:  Discontinued      01/20/20 0932 01/20/20 0932  ondansetron (ZOFRAN) injection 4 mg  As Needed,   Status:  Discontinued      01/20/20 0932 01/20/20 0932  metoclopramide (REGLAN) injection 5 mg  Every 15 Minutes PRN,   Status:  Discontinued      01/20/20 0932 01/20/20 0932  fentaNYL citrate (PF) (SUBLIMAZE) injection 25 mcg  As Needed,   Status:  Discontinued      01/20/20 0932 01/20/20 0932  atropine sulfate injection 0.5 mg  Once As Needed,   Status:  Discontinued      01/20/20 0932 01/20/20 0932  flumazenil (ROMAZICON) injection 0.2 mg  As Needed,   Status:  Discontinued      01/20/20 0932 01/20/20 0900  sodium chloride 0.9 % flush 3 mL  Every 12 Hours Scheduled,   Status:  Discontinued      01/20/20 0640    01/20/20 0840  Tissue Pathology Exam      01/20/20 0840     01/20/20 0840  bupivacaine-EPINEPHrine PF (MARCAINE w/EPI) 0.25% -[id number] injection  As Needed,   Status:  Discontinued      01/20/20 0840    01/20/20 0839  sodium chloride (NS) irrigation solution  As Needed,   Status:  Discontinued      01/20/20 0839    01/20/20 0838  sodium chloride 1,000 mL with bacitracin 50,000 Units irrigation  As Needed,   Status:  Discontinued      01/20/20 0839    01/20/20 0701  XR Spine Cervical 2 View  1 Time Imaging      01/20/20 0700    01/20/20 0701  FL C Arm During Surgery  1 Time Imaging      01/20/20 0700    01/20/20 0644  Inpatient Admission  Once      01/20/20 0643    01/20/20 0642  lactated ringers infusion  Continuous,   Status:  Discontinued      01/20/20 0640    01/20/20 0642  acetaminophen (TYLENOL) tablet 1,000 mg  Once      01/20/20 0640    01/20/20 0640  Oxygen Therapy- Nasal Cannula; Titrate for SPO2: equal to or greater than, 90%  Continuous,   Status:  Canceled      01/20/20 0640    01/20/20 0640  Pulse Oximetry, Continuous  Continuous,   Status:  Canceled      01/20/20 0640    01/20/20 0640  Insert Peripheral IV  Once,   Status:  Canceled      01/20/20 0640    01/20/20 0640  Saline Lock & Maintain IV Access  Continuous,   Status:  Canceled      01/20/20 0640    01/20/20 0639  Vital Signs - Per Anesthesia Protocol  As Needed,   Status:  Canceled      01/20/20 0640    01/20/20 0639  sodium chloride 0.9 % flush 3-10 mL  As Needed,   Status:  Discontinued      01/20/20 0640    01/20/20 0639  lidocaine PF 1% (XYLOCAINE) injection 0.5 mL  Once As Needed,   Status:  Discontinued      01/20/20 0640    01/20/20 0639  midazolam (VERSED) injection 1 mg  Every 5 Minutes PRN,   Status:  Discontinued      01/20/20 0640    01/20/20 0639  midazolam (VERSED) injection 2 mg  Every 5 Minutes PRN,   Status:  Discontinued      01/20/20 0640    01/20/20 0548  Type and screen  Once      01/20/20 0548    01/20/20 0545  lactated ringers infusion 1,000 mL  Continuous,   Status:  Discontinued       01/20/20 0543    01/20/20 0545  lactated ringers infusion 1,000 mL  Continuous,   Status:  Discontinued      01/20/20 0543    01/20/20 0543  Insert Peripheral IV  Once,   Status:  Canceled      01/20/20 0543    01/20/20 0543  Maintain IV Access  Continuous,   Status:  Canceled      01/20/20 0543    01/20/20 0543  Insert Peripheral IV  Once,   Status:  Canceled      01/20/20 0543    01/20/20 0543  Maintain IV Access  Continuous,   Status:  Canceled      01/20/20 0543    01/20/20 0543  Pregnancy, Urine - Urine, Clean Catch  STAT      01/20/20 0543    01/20/20 0542  sodium chloride 0.9 % flush 10 mL  As Needed,   Status:  Discontinued      01/20/20 0543    01/20/20 0542  lidocaine PF 1% (XYLOCAINE) injection 0.5 mL  Once As Needed,   Status:  Discontinued      01/20/20 0543 01/20/20 0542  sodium chloride 0.9 % flush 3 mL  As Needed,   Status:  Discontinued      01/20/20 0543    01/20/20 0541  ceFAZolin in 0.9% normal saline (ANCEF) IVPB solution 2 g  Once      01/20/20 0539    01/20/20 0540  Follow Anesthesia Guidelines / Standing Orders  Once,   Status:  Canceled      01/20/20 0539    01/20/20 0540  Obtain informed consent  Once,   Status:  Canceled      01/20/20 0539    01/20/20 0540  SCD (sequential compression device)- to be placed on patient in Pre-op  Once      01/20/20 0539    01/20/20 0540  Verify / Perform Chlorhexidine Skin Prep  Once,   Status:  Canceled     Comments:  Chlorhexidine Skin Wipes and Instructions For All Patients Having A Procedure Requiring an Outward Incision if Not Allergic.  If Allergic, Give Antibacterial Skin Wipes and Instructions.  Do Not Use For Facial Cases or on Any Mucus Membranes.    01/20/20 0539    01/20/20 0540  Verify NPO Status  Until Discontinued,   Status:  Canceled      01/20/20 0539    01/20/20 0540  Oxygen Therapy-  Continuous,   Status:  Canceled      01/20/20 0539    Unscheduled  Bladder Scan if Patient Unable to Void 4-6 Hours After Catheter Removal  As Needed       01/20/20 1051    Unscheduled  Straight Cath Every 4-6 Hours As Needed If Patient is Unable to Void After 4-6 Hours, Bladder Scan Volume is Greater Than 500mL & Patient Has Symptoms of Bladder Discomfort / Distention  As Needed      01/20/20 1051    Unscheduled  Consult Pharmacist For Review of Medications That May Cause Urinary Retention - RN To Place Order for Consult it Needed  As Needed      01/20/20 1051    Unscheduled  Schedule / Prompt Voiding For Patients With Urinary Incontinence  As Needed      01/20/20 1051              Ventilator/Non-Invasive Ventilation Settings (From admission, onward)    None           Operative/Procedure Notes (all)      John Vernon MD at 01/20/20 0722  Version 1 of 1         Procedure Note  Preop Diagnosis: Displacement of cervical intervertebral disc without myelopathy [M50.20]    Post-Op Diagnosis Codes:     * Displacement of cervical intervertebral disc without myelopathy [M50.20]     Procedure Name:C5/6 anterior discectomy  C5/6 anterior interbody fusion with allograft  C5/6 partial corpectomies less than 50%  C5/6 anterior instrumented fusion  Using the operative microscope          Indications:  A MRI revealed findings of Cervical Radiculopathy . The patient now presents for anterior cervical discectomy and fusion of C5/6 after discussing therapeutic alternatives.          Surgeon: John Vernon MD     Assistants: none    Anesthesia: General endotracheal anesthesia    ASA Class: 3    Procedure Details   After obtaining informed consent, having the risks and benefits of the procedure explained including but not limited to infection, bleeding, paralysis, spinal fluid leak, speech and swallow difficulty, stroke, coma, and death, the patient was brought to the operating room.  The patient was given general anesthesia via an endotracheal tube.  The patient was laid supine on the operating table.  The patient was placed in an occipital mandibular head harness and 5  pounds of axial traction.  A preplanned incision in the right anterior portion of the neck was marked with indelible marker.  Fluoroscopy confirmed the correct level of C5/6.  The patient was then prepped and draped in a standard sterile fashion.  The preplanned incision was infiltrated with Marcaine and epinephrine.  A 10 blade scalpel was used to make an incision to the dermis and epidermis.  Bovie cautery was used to extend the incision down to the subcutaneous and soft tissues to the level of the platysma.  The platysma was then split longitudinally using Metzenbaum scissors.  The connective tissue plane lateral to the esophagus and trachea medial to the carotid artery was then developed using blunt and sharp dissection.  Once the anterior portion of the vertebral bodies were identified a bent spinal needle was placed into the disc space at C5/6.  Fluoroscopy confirmed this was the correct level.  The longus colli were then dissected off the anterior lateral portions of the vertebral bodies using Bovie cautery.  The Shadow-Line retractor system was then placed into the wound.  At this point the operative microscope was brought in.  Discectomies at each level were then conducted using a 15 blade scalpel to incise the annulus then a series of up-biting curettes, pituitary rongeurs and a 5 mm barrel bit were used.  Once the discectomy was completed the posterior longitudinal ligament at each level was identified. It was bluntly dissected using a nerve hook.  The PLL was then incised using a 1 mm Kerrison.  Bilateral foraminotomies at each level were then conducted using a 1.5 mm Kerrison.  Partial corpectomies of the posterior portion of the vertebral bodies of C5/6 were then done using a 2 mm Kerrison.  Once this was completed a 6 mm peek interbody spacer was tapped into place at each level.  The spacers were filled with allograft.   A series of  15 mm self drilling self-tapping screws were placed into the  vertebral bodies.  Final AP and lateral fluoroscopy showed good position of all interbody devices and hardware.  The wound was then copiously irrigated with antibiotic solution.  The wound was inspected for hemostasis.  The subcutaneous tissues were then closed using a series of inverted interrupted 2-0 Vicryl sutures.  The skin was closed using running 4-0 Monocryl subcuticular.  All sponge, needle and instrument counts were correct at the end of the procedure.  The patient was extubated in stable condition and returned to recovery room with about 50 mL of blood loss.        Findings:  Cervical Radiculopathy    Estimated Blood Loss:  50           Drains: none           Total IV Fluids: ml           Specimens:   Specimens     ID Source Type Tests Collected By Collected At Frozen?      A Spine, Cervical Disc · TISSUE PATHOLOGY EXAM   John Vernon MD 1/20/20 0809 No     Description: DISC C5-6                 Implants:   Implant Name Type Inv. Item Serial No.  Lot No. LRB No. Used   PUTTY DBM FATIMAH 2.5CC - [id number] - [id number] Implant PUTTY DBM FATIMAH 2.5CC [id number] [employer]  N/A 1   CAGE DIVERGENCE STANDALONE LRD [id number] - [id number] Implant CAGE DIVERGENCE STANDALONE LRD [id number]  [employer] [id number] N/A 1   SCRW SD SA 3.5X15MM PK/2 - [id number] Implant SCRW SD SA 3.5X15MM PK/2  [employer] [id number] N/A 1              Complications:  none           Disposition: PACU - hemodynamically stable.           Condition: stable        John Vernon MD    Electronically signed by John Vernon MD at 01/20/20 0944         Physician Progress Notes (last 48 hours) (Notes from 01/18/20 1244 through 01/20/20 1244)    No notes of this type exist for this encounter.       Medical Student Notes (last 48 hours) (Notes from 01/18/20 1244 through 01/20/20 1244)    No notes of this type exist for this encounter.

## 2020-01-20 NOTE — H&P
SUBJECTIVE:  Patient ID: Dipika Ford is a 39 y.o. female is here today for follow-up.    Chief Complaint: Neck pain  No chief complaint on file.      HPI  39-year-old female that we have been following for right paraspinal neck pain, right upper extremity radicular pain along with numbness and tingling.  She is gone through an extensive course of conservative care involving physical therapy, medication, anti-inflammatories, pain management injections.  She has had mixed results but still has a significant amount of lingering symptoms.  We sent her for repeat MRI and an EMG nerve conduction study she is here to discuss the results.    The following portions of the patient's history were reviewed and updated as appropriate: allergies, current medications, past family history, past medical history, past social history, past surgical history and problem list.    OBJECTIVE:    Review of Systems   Musculoskeletal: Positive for neck pain.   Neurological: Positive for numbness.   All other systems reviewed and are negative.         Physical Exam   Constitutional: She is oriented to person, place, and time.   Neurological: She is oriented to person, place, and time. She has normal strength. She has a normal Finger-Nose-Finger Test. Gait normal.   Reflex Scores:       Tricep reflexes are 1+ on the right side and 2+ on the left side.       Bicep reflexes are 0 on the right side and 2+ on the left side.       Brachioradialis reflexes are 0 on the right side and 2+ on the left side.  Psychiatric: Her speech is normal.       Neurologic Exam     Mental Status   Oriented to person, place, and time.   Speech: speech is normal   Level of consciousness: alert  Knowledge: good.     Cranial Nerves   Cranial nerves II through XII intact.     Motor Exam   Muscle bulk: normal  Overall muscle tone: normal  Right arm pronator drift: absent  Left arm pronator drift: absent    Strength   Strength 5/5 throughout.     Sensory Exam   Right  arm light touch: decreased from elbow  Right arm pinprick: decreased from elbow    Gait, Coordination, and Reflexes     Gait  Gait: normal    Coordination   Finger to nose coordination: normal    Tremor   Resting tremor: absent  Intention tremor: absent  Action tremor: absent    Reflexes   Right brachioradialis: 0  Left brachioradialis: 2+  Right biceps: 0  Left biceps: 2+  Right triceps: 1+  Left triceps: 2+      Independent Review of Radiographic Studies:   EMG nerve conduction study is negative.  MRI of the cervical spine shows cervical disc herniation eccentric to the right at C5-6.    ASSESSMENT/PLAN:  The patient has a disc herniation at C5-6 which is responsible for her right upper extremity symptoms.  The EMG nerve conduction study was negative for any peripheral nerve entrapment.  I would recommend a single level anterior cervical fusion at this point.  The risks and benefits were discussed at length which include but were not limited to infection, bleeding, paralysis, spinal fluid leak, speech and swallow difficulty, stroke, coma, and death.  The patient acknowledged understand this.  Her questions and concerns were addressed.  We will get her prepped and schedule this soon as possible.      1. Displacement of cervical intervertebral disc without myelopathy            No follow-ups on file.      John Vernon MD

## 2020-01-20 NOTE — THERAPY DISCHARGE NOTE
Acute Care - Occupational Therapy Initial Eval/Discharge  [facility]     Patient Name: Dipika Ford  : 1980  MRN: [id number]  Today's Date: 2020  Onset of Illness/Injury or Date of Surgery: 20  Date of Referral to OT: 20  Referring Physician: Dr. Vernon      Admit Date: 2020       ICD-10-CM ICD-9-CM   1. Displacement of cervical intervertebral disc without myelopathy M50.20 722.0     Patient Active Problem List   Diagnosis   • Displacement of cervical intervertebral disc without myelopathy   • Non-smoker   • BMI 45.0-49.9, adult (CMS/HCC)   • BMI 40.0-44.9, adult (CMS/HCC)   • Right arm pain   • Numbness and tingling of right upper extremity     Past Medical History:   Diagnosis Date   • Neck pain    • No known health problems    • Numbness and tingling in right hand 2018     Past Surgical History:   Procedure Laterality Date   • CARPAL TUNNEL RELEASE Bilateral    • ULNAR NERVE DECOMPRESSION Left           OT ASSESSMENT FLOWSHEET (last 12 hours)      Occupational Therapy Evaluation     Row Name 20 1357                   OT Evaluation Time/Intention    Subjective Information  complains of;nausea/vomiting  -MM        Document Type  evaluation  -MM        Mode of Treatment  occupational therapy  -MM           General Information    Patient Profile Reviewed?  yes  -MM        Onset of Illness/Injury or Date of Surgery  20  -MM        Referring Physician  Dr. Vernon  -MM        Patient Observations  alert;cooperative;agree to therapy  -MM        Patient/Family Observations  mother present  -MM        General Observations of Patient  long sitting in bed, with PT, emesis bag, reports just finished vomitting  -MM        Prior Level of Function  independent:;all household mobility;community mobility;ADL's  -MM        Equipment Currently Used at Home  none  -MM        Pertinent History of Current Functional Problem  s/p ACDF C5-6, R paraspinal neck pain, R UE pain with numbness  and tingling  -MM        Existing Precautions/Restrictions  spinal;other (see comments) c-collar at all times  -MM        Risks Reviewed  patient:;LOB;nausea/vomiting;increased discomfort;dizziness;change in vital signs  -MM        Benefits Reviewed  patient:;improve function;increase independence;increase strength;increase balance;decrease pain;decrease risk of DVT;improve skin integrity;increase knowledge  -MM        Barriers to Rehab  none identified  -MM           Relationship/Environment    Lives With  child(lucinda), dependent  -MM           Resource/Environmental Concerns    Current Living Arrangements  home/apartment/condo  -MM           Home Main Entrance    Number of Stairs, Main Entrance  four  -MM        Stair Railings, Main Entrance  railing on left side (ascending)  -MM           Cognitive Assessment/Interventions    Additional Documentation  Cognitive Assessment/Intervention (Group)  -MM           Cognitive Assessment/Intervention- PT/OT    Affect/Mental Status (Cognitive)  WNL  -MM        Orientation Status (Cognition)  oriented x 4  -MM        Follows Commands (Cognition)  WNL  -MM        Cognitive Function (Cognitive)  WNL  -MM        Personal Safety Interventions  fall prevention program maintained;gait belt;muscle strengthening facilitated;nonskid shoes/slippers when out of bed;supervised activity  -MM           Bed Mobility Assessment/Treatment    Comment (Bed Mobility)  per pt and PT report, independent supine to sit  -MM           Functional Mobility    Functional Mobility- Ind. Level  independent  -MM        Functional Mobility- Comment  down armstrong  -MM           Transfer Assessment/Treatment    Transfer Assessment/Treatment  sit-stand transfer;stand-sit transfer  -MM           Sit-Stand Transfer    Sit-Stand [place] (Transfers)  independent  -MM           Stand-Sit Transfer    Stand-Sit [place] (Transfers)  independent  -MM           ADL Assessment/Intervention    BADL  Assessment/Intervention  upper body dressing;lower body dressing  -MM           Upper Body Dressing Assessment/Training    Upper Body Dressing [place] Level  don;doff;minimum assist (75% patient effort) c-collar, while education is being completed  -MM        Upper Body Dressing Position  edge of bed sitting  -MM           Lower Body Dressing Assessment/Training    Lower Body Dressing [place] Level  don;doff;socks;independent simulated  -MM        Lower Body Dressing Position  edge of bed sitting  -MM        Comment (Lower Body Dressing)  crossing BLE over opposing knees  -MM           General ROM    GENERAL ROM COMMENTS  BUE WNL  -MM           MMT (Manual Muscle Testing)    General MMT Comments  Grossly 5/5  -MM           Motor Assessment/Interventions    Additional Documentation  Fine Motor Testing & Training (Group);Gross Motor Coordination (Group)  -MM           Gross Motor Coordination    Gross Motor Skill, Impairments Detail  BUE WNL  -MM           Fine Motor Testing & Training    Comment, Fine Motor Coordination  BUE WNL  -MM           Sensory Assessment/Intervention    Sensory General Assessment  no sensation deficits identified  -MM           Positioning and Restraints    Pre-Treatment Position  in bed  -MM        Post Treatment Position  chair  -MM        In Chair  notified nsg;sitting;call light within reach;encouraged to call for assist;with family/caregiver;with brace  -MM           Pain Scale: Numbers Pre/Post-Treatment    Pain Scale: Numbers, Pretreatment  0/10 - no pain  -MM        Pain Scale: Numbers, Post-Treatment  0/10 - no pain  -MM           Wound 01/20/20 0927 neck    Wound - Properties Group Date first assessed: 01/20/20  - Time first assessed: 0927  - Location: neck  -SM       Plan of Care Review    Plan of Care Reviewed With  patient  -MM        Progress  improving  -MM        Outcome Summary  OT eval completed. Pt alert and oriented x4. Pt in good spirits and motivated. Pt  reports vomitting. New aspen collar provided d/t vomitting on brace pads. Pt was independent for all mobility and adls assessed this date. Pt had no sensation, strength or coordination deficits noted. Pt educated on c-collar and precautions. Skilled OT not warranted d/t pt independence level. Pt reports no pain. Recommended d/c home with assist.  -MM           Clinical Impression (OT)    Date of Referral to OT  01/20/20  -MM        Prognosis (OT Eval)  good  -MM        Functional Level at Time of Evaluation (OT Eval)  good  -MM        Patient/Family Goals Statement (OT Eval)  return home, decrease nausea  -MM        Criteria for Skilled Therapeutic Interventions Met (OT Eval)  no;no problems identified which require skilled intervention;current level of function same as previous level of function  -MM        Therapy Frequency (OT Eval)  evaluation only  -MM        Predicted Duration of Therapy Intervention (Therapy Eval)  until d/c  -MM        Care Plan Review (OT)  evaluation/treatment results reviewed;care plan/treatment goals reviewed;risks/benefits reviewed;current/potential barriers reviewed;patient/other agree to care plan  -MM        Care Plan Review, Other Participant (OT Eval)  mother  -MM        Anticipated Discharge Disposition (OT)  home with assist  -MM          User Key  (r) = Recorded By, (t) = Taken By, (c) = Cosigned By    Initials Name Effective Dates     Anuradha Dubois RN 09/19/16 -     Signh Ku, OTR/L 04/03/18 -           Occupational Therapy Education                 Title: PT OT SLP Therapies (Done)     Topic: Occupational Therapy (Done)     Point: ADL training (Done)     Description:   Instruct learner(s) on proper safety adaptation and remediation techniques during self care or transfers.   Instruct in proper use of assistive devices.              Learning Progress Summary           Patient Acceptance, E, VU by MM at 1/20/2020 0970    Comment:  OT role, benefits, POC, d/c  planning, precautions and c-collar.   Family Acceptance, E, VU by MM at 1/20/2020 1554    Comment:  OT role, benefits, POC, d/c planning, precautions and c-collar.                   Point: Precautions (Done)     Description:   Instruct learner(s) on prescribed precautions during self-care and functional transfers.              Learning Progress Summary           Patient Acceptance, E, VU by MM at 1/20/2020 1554    Comment:  OT role, benefits, POC, d/c planning, precautions and c-collar.   Family Acceptance, E, VU by MM at 1/20/2020 1554    Comment:  OT role, benefits, POC, d/c planning, precautions and c-collar.                   Point: Body mechanics (Done)     Description:   Instruct learner(s) on proper positioning and spine alignment during self-care, functional mobility activities and/or exercises.              Learning Progress Summary           Patient Acceptance, E, VU by MM at 1/20/2020 1554    Comment:  OT role, benefits, POC, d/c planning, precautions and c-collar.   Family Acceptance, E, VU by MM at 1/20/2020 1554    Comment:  OT role, benefits, POC, d/c planning, precautions and c-collar.                               User Key     Initials Effective Dates Name Provider Type Discipline     04/03/18 -  Singh Nova, OTR/L Occupational Therapist OT                OT Recommendation and Plan  Outcome Summary/Treatment Plan (OT)  Anticipated Discharge Disposition (OT): home with assist  Therapy Frequency (OT Eval): evaluation only  Plan of Care Review  Plan of Care Reviewed With: patient  Plan of Care Reviewed With: patient  Outcome Summary: OT eval completed. Pt alert and oriented x4. Pt in good spirits and motivated. Pt reports vomitting. New aspen collar provided d/t vomitting on brace pads. Pt was independent for all mobility and adls assessed this date. Pt had no sensation, strength or coordination deficits noted. Pt educated on c-collar and precautions. Skilled OT not warranted d/t pt  independence level. Pt reports no pain. Recommended d/c home with assist.         Outcome Measures     Row Name 01/20/20 1500             How much help from another is currently needed...    Putting on and taking off regular lower body clothing?  4  -MM      Bathing (including washing, rinsing, and drying)  4  -MM      Toileting (which includes using toilet bed pan or urinal)  4  -MM      Putting on and taking off regular upper body clothing  4  -MM      Taking care of personal grooming (such as brushing teeth)  4  -MM      Eating meals  4  -MM      AM-PAC 6 Clicks Score (OT)  24  -MM         Functional Assessment    Outcome Measure Options  AM-PAC 6 Clicks Daily Activity (OT)  -MM        User Key  (r) = Recorded By, (t) = Taken By, (c) = Cosigned By    Initials Name Provider Type    Singh Ku OTR/L Occupational Therapist          Time Calculation:   Time Calculation- OT     Row Name 01/20/20 1357             Time Calculation- OT    OT Start Time  1357  -MM      OT Stop Time  1425  -MM      OT Time Calculation (min)  28 min  -MM      OT Received On  01/20/20  -MM        User Key  (r) = Recorded By, (t) = Taken By, (c) = Cosigned By    Initials Name Provider Type    Singh Ku OTR/L Occupational Therapist        Therapy Suggested Charges     Code   Minutes Charges    None           Therapy Charges for Today     Code Description Service Date Service Provider Modifiers Qty    13573194823 HC OT EVAL LOW COMPLEXITY 2 1/20/2020 Singh Nova OTR/L GO 1               OT Discharge Summary  Anticipated Discharge Disposition (OT): home with assist  Reason for Discharge: At baseline function, Independent  Outcomes Achieved: Refer to plan of care for updates on goals achieved  Discharge Destination: Home with assist    ROQUE Luo/JAMAAL  1/20/2020

## 2020-01-20 NOTE — THERAPY DISCHARGE NOTE
Patient Name: Dipika Ford  : 1980    MRN: [id number]                              Today's Date: 2020       Admit Date: 2020    Visit Dx:     ICD-10-CM ICD-9-CM   1. Displacement of cervical intervertebral disc without myelopathy M50.20 722.0     Patient Active Problem List   Diagnosis   • Displacement of cervical intervertebral disc without myelopathy   • Non-smoker   • BMI 45.0-49.9, adult (CMS/HCC)   • BMI 40.0-44.9, adult (CMS/HCC)   • Right arm pain   • Numbness and tingling of right upper extremity     Past Medical History:   Diagnosis Date   • Neck pain    • No known health problems    • Numbness and tingling in right hand      Past Surgical History:   Procedure Laterality Date   • CARPAL TUNNEL RELEASE Bilateral    • ULNAR NERVE DECOMPRESSION Left      General Information     Row Name 20 1353          PT Evaluation Time/Intention    Document Type  evaluation s/p ACDF C5-6, R paraspinal neck pain, R UE pain with numbness and tingling  -MS     Mode of Treatment  physical therapy;concurrent therapy  -MS     Row Name 20 1359          General Information    Patient Profile Reviewed?  yes  -MS     Prior Level of Function  independent:;all household mobility;community mobility;ADL's  -MS     Existing Precautions/Restrictions  spinal neck brace at all times  -MS     Barriers to Rehab  none identified  -MS     Row Name 20 1359          Relationship/Environment    Lives With  child(lucinda), dependent 18 yo, 16yo, and 7 yo  -MS     Row Name 20 1358          Resource/Environmental Concerns    Current Living Arrangements  home/apartment/condo  -MS     Row Name 20 1358          Home Main Entrance    Number of Stairs, Main Entrance  four  -MS     Stair Railings, Main Entrance  railing on left side (ascending)  -MS     Row Name 20 1357          Cognitive Assessment/Intervention- PT/OT    Orientation Status (Cognition)  oriented x 4  -MS       User Key  (r) =  Recorded By, (t) = Taken By, (c) = Cosigned By    Initials Name Provider Type    Dayna Fuentes VANNESA, PT, DPT, NCS Physical Therapist        Mobility     Row Name 01/20/20 1421          Bed Mobility Assessment/Treatment    Bed Mobility Assessment/Treatment  supine-sit  -MS     Supine-Sit [place] (Bed Mobility)  independent  -MS     Row Name 01/20/20 1421          Sit-Stand Transfer    Sit-Stand [place] (Transfers)  independent  -MS     Row Name 01/20/20 1421          Gait/Stairs Assessment/Training    [place] Level (Gait)  independent  -MS     Distance in Feet (Gait)  200ft, no gait deficits noted  -MS       User Key  (r) = Recorded By, (t) = Taken By, (c) = Cosigned By    Initials Name Provider Type    Dayna Fuentes VANNESA, PT, DPT, NCS Physical Therapist        Obj/Interventions     Row Name 01/20/20 1424          General ROM    GENERAL ROM COMMENTS  all extremities WNL  -MS     Row Name 01/20/20 1424          MMT (Manual Muscle Testing)    General MMT Comments  grossly 5/5 throughout  -MS     Row Name 01/20/20 1424          Sensory Assessment/Intervention    Sensory General Assessment  no sensation deficits identified  -MS       User Key  (r) = Recorded By, (t) = Taken By, (c) = Cosigned By    Initials Name Provider Type    Clay Fuentesmitchell GRANADO, PT, DPT, NCS Physical Therapist        Goals/Plan    No documentation.       Clinical Impression     Row Name 01/20/20 1424          Pain Assessment    Additional Documentation  Pain Scale: Numbers Pre/Post-Treatment (Group) nausea with vomiting  -MS     Row Name 01/20/20 1424          Pain Scale: Numbers Pre/Post-Treatment    Pain Scale: Numbers, Pretreatment  0/10 - no pain  -MS     Pain Scale: Numbers, Post-Treatment  0/10 - no pain  -MS     Row Name 01/20/20 1424          Plan of Care Review    Plan of Care Reviewed With  patient  -MS     Progress  improving  -MS     Outcome Summary  PT evaluation completed. The patient presents lying in bed, awake, and  oriented with Aspen collar in place. Pt has miami J collar in the car. Currently the Aspen collar fits correctly and extra pads were given due to her vomiting on her collar. The patient presents with no sensation deficits or strength deficits. She is up independently and was educated on use of either cervical collar. Recommend discharge home with assist.   -MS     Row Name 01/20/20 1424          Physical Therapy Clinical Impression    Criteria for Skilled Interventions Met (PT Clinical Impression)  no problems identified which require skilled intervention  -MS     Row Name 01/20/20 1424          Positioning and Restraints    Post Treatment Position  chair  -MS     In Chair  notified nsg;sitting;call light within reach;encouraged to call for assist;with family/caregiver;with brace  -MS       User Key  (r) = Recorded By, (t) = Taken By, (c) = Cosigned By    Initials Name Provider Type    Dayna Fuentes, PT, DPT, NCS Physical Therapist        Outcome Measures     Row Name 01/20/20 1427          How much help from another person do you currently need...    Turning from your back to your side while in flat bed without using bedrails?  4  -MS     Moving from lying on back to sitting on the side of a flat bed without bedrails?  4  -MS     Moving to and from a bed to a chair (including a wheelchair)?  4  -MS     Standing up from a chair using your arms (e.g., wheelchair, bedside chair)?  4  -MS     Climbing 3-5 steps with a railing?  4  -MS     To walk in hospital room?  4  -MS     AM-PAC 6 Clicks Score (PT)  24  -MS     Row Name 01/20/20 1427          Functional Assessment    Outcome Measure Options  AM-PAC 6 Clicks Basic Mobility (PT)  -MS       User Key  (r) = Recorded By, (t) = Taken By, (c) = Cosigned By    Initials Name Provider Type    Dayna Fuentes, PT, DPT, NCS Physical Therapist          PT Recommendation and Plan     Outcome Summary/Treatment Plan (PT)  Anticipated Discharge Disposition (PT): home with  assist  Plan of Care Reviewed With: patient  Progress: improving  Outcome Summary: PT evaluation completed. The patient presents lying in bed, awake, and oriented with Aspen collar in place. Pt has miami J collar in the car. Currently the Aspen collar fits correctly and extra pads were given due to her vomiting on her collar. The patient presents with no sensation deficits or strength deficits. She is up independently and was educated on use of either cervical collar. Recommend discharge home with assist.      Time Calculation:   PT Charges     Row Name 01/20/20 1428             Time Calculation    Start Time  1355  -MS      Stop Time  1425  -MS      Time Calculation (min)  30 min  -MS      PT Received On  01/20/20  -MS        User Key  (r) = Recorded By, (t) = Taken By, (c) = Cosigned By    Initials Name Provider Type    Dayna Fuentes, PT, DPT, NCS Physical Therapist        Therapy Charges for Today     Code Description Service Date Service Provider Modifiers Qty    44418885618 HC PT EVAL LOW COMPLEXITY 2 1/20/2020 Dayna Ortiz PT, DPT, NCS GP 1          PT G-Codes  Outcome Measure Options: AM-PAC 6 Clicks Basic Mobility (PT)  AM-PAC 6 Clicks Score (PT): 24    PT Discharge Summary  Anticipated Discharge Disposition (PT): home with assist    Dayna Ortiz, JAYDA, DPT, NCS  1/20/2020

## 2020-01-20 NOTE — ANESTHESIA POSTPROCEDURE EVALUATION
Patient: Dipika Ford    Procedure Summary     Date:  01/20/20 Room / Location:   [facility]  /  [facility]    Anesthesia Start:  0742 Anesthesia Stop:  0945    Procedure:  CERVICAL DISCECTOMY ANTERIOR WITH FUSION C5-6 (N/A Spine Cervical) Diagnosis:       Displacement of cervical intervertebral disc without myelopathy      (Displacement of cervical intervertebral disc without myelopathy [M50.20])    Surgeon:  John Vernon MD Provider:  Oscar Craven CRNA    Anesthesia Type:  general ASA Status:  3          Anesthesia Type: general    Vitals  Vitals Value Taken Time   BP     Temp     Pulse 78 1/20/2020  9:45 AM   Resp     SpO2 99 % 1/20/2020  9:45 AM   Vitals shown include unvalidated device data.        Post Anesthesia Care and Evaluation    Patient location during evaluation: PACU  Patient participation: complete - patient participated  Level of consciousness: awake and alert  Pain management: adequate  Airway patency: patent  Anesthetic complications: No anesthetic complications    Cardiovascular status: acceptable  Respiratory status: acceptable  Hydration status: acceptable    Comments: Blood pressure 137/84, pulse 77, temperature 97.9 °F (36.6 °C), temperature source Temporal, resp. rate 16, last menstrual period 01/12/2020, SpO2 100 %, not currently breastfeeding.    Pt discharged from PACU based on james score >8

## 2020-01-21 ENCOUNTER — TELEPHONE (OUTPATIENT)
Dept: NEUROSURGERY | Facility: CLINIC | Age: 40
End: 2020-01-21

## 2020-01-21 VITALS
DIASTOLIC BLOOD PRESSURE: 96 MMHG | TEMPERATURE: 97.4 F | SYSTOLIC BLOOD PRESSURE: 148 MMHG | HEART RATE: 90 BPM | OXYGEN SATURATION: 98 % | RESPIRATION RATE: 18 BRPM

## 2020-01-21 DIAGNOSIS — M50.20 DISPLACEMENT OF CERVICAL INTERVERTEBRAL DISC WITHOUT MYELOPATHY: Primary | ICD-10-CM

## 2020-01-21 LAB
ANION GAP SERPL CALCULATED.3IONS-SCNC: 11 MMOL/L (ref 5–15)
BASOPHILS # BLD AUTO: 0.03 10*3/MM3 (ref 0–0.2)
BASOPHILS NFR BLD AUTO: 0.4 % (ref 0–1.5)
BUN BLD-MCNC: 7 MG/DL (ref 6–20)
BUN/CREAT SERPL: 11.7 (ref 7–25)
CALCIUM SPEC-SCNC: 8.9 MG/DL (ref 8.6–10.5)
CHLORIDE SERPL-SCNC: 106 MMOL/L (ref 98–107)
CO2 SERPL-SCNC: 23 MMOL/L (ref 22–29)
CREAT BLD-MCNC: 0.6 MG/DL (ref 0.57–1)
DEPRECATED RDW RBC AUTO: 35.8 FL (ref 37–54)
EOSINOPHIL # BLD AUTO: 0.04 10*3/MM3 (ref 0–0.4)
EOSINOPHIL NFR BLD AUTO: 0.5 % (ref 0.3–6.2)
ERYTHROCYTE [DISTWIDTH] IN BLOOD BY AUTOMATED COUNT: 12.2 % (ref 12.3–15.4)
GFR SERPL CREATININE-BSD FRML MDRD: 112 ML/MIN/1.73
GLUCOSE BLD-MCNC: 174 MG/DL (ref 65–99)
HCT VFR BLD AUTO: 38 % (ref 34–46.6)
HGB BLD-MCNC: 13.1 G/DL (ref 12–15.9)
IMM GRANULOCYTES # BLD AUTO: 0.02 10*3/MM3 (ref 0–0.05)
IMM GRANULOCYTES NFR BLD AUTO: 0.2 % (ref 0–0.5)
LYMPHOCYTES # BLD AUTO: 2.22 10*3/MM3 (ref 0.7–3.1)
LYMPHOCYTES NFR BLD AUTO: 26.8 % (ref 19.6–45.3)
MCH RBC QN AUTO: 27.8 PG (ref 26.6–33)
MCHC RBC AUTO-ENTMCNC: 34.5 G/DL (ref 31.5–35.7)
MCV RBC AUTO: 80.7 FL (ref 79–97)
MONOCYTES # BLD AUTO: 0.46 10*3/MM3 (ref 0.1–0.9)
MONOCYTES NFR BLD AUTO: 5.5 % (ref 5–12)
NEUTROPHILS # BLD AUTO: 5.52 10*3/MM3 (ref 1.7–7)
NEUTROPHILS NFR BLD AUTO: 66.6 % (ref 42.7–76)
NRBC BLD AUTO-RTO: 0 /100 WBC (ref 0–0.2)
PLATELET # BLD AUTO: 205 10*3/MM3 (ref 140–450)
PMV BLD AUTO: 9 FL (ref 6–12)
POTASSIUM BLD-SCNC: 3.5 MMOL/L (ref 3.5–5.2)
RBC # BLD AUTO: 4.71 10*6/MM3 (ref 3.77–5.28)
SODIUM BLD-SCNC: 140 MMOL/L (ref 136–145)
WBC NRBC COR # BLD: 8.29 10*3/MM3 (ref 3.4–10.8)

## 2020-01-21 PROCEDURE — 99024 POSTOP FOLLOW-UP VISIT: CPT | Performed by: NURSE PRACTITIONER

## 2020-01-21 PROCEDURE — 25010000002 CEFAZOLIN PER 500 MG: Performed by: NURSE PRACTITIONER

## 2020-01-21 PROCEDURE — 85025 COMPLETE CBC W/AUTO DIFF WBC: CPT | Performed by: NURSE PRACTITIONER

## 2020-01-21 PROCEDURE — 80048 BASIC METABOLIC PNL TOTAL CA: CPT | Performed by: NURSE PRACTITIONER

## 2020-01-21 RX ORDER — HYDROCODONE BITARTRATE AND ACETAMINOPHEN 7.5; 325 MG/1; MG/1
1 TABLET ORAL EVERY 6 HOURS PRN
Qty: 120 TABLET | Refills: 0 | Status: SHIPPED | OUTPATIENT
Start: 2020-01-21 | End: 2020-02-20

## 2020-01-21 RX ORDER — HYDROCODONE BITARTRATE AND ACETAMINOPHEN 5; 325 MG/1; MG/1
1 TABLET ORAL EVERY 6 HOURS PRN
Qty: 28 TABLET | Refills: 0 | Status: SHIPPED | OUTPATIENT
Start: 2020-01-21 | End: 2020-01-28

## 2020-01-21 RX ADMIN — SODIUM CHLORIDE 3 G: 9 INJECTION, SOLUTION INTRAVENOUS at 08:19

## 2020-01-21 RX ADMIN — SODIUM CHLORIDE 3 G: 9 INJECTION, SOLUTION INTRAVENOUS at 00:36

## 2020-01-21 RX ADMIN — SODIUM CHLORIDE 75 ML/HR: 9 INJECTION, SOLUTION INTRAVENOUS at 00:36

## 2020-01-21 NOTE — PAYOR COMM NOTE
"[facility] HOME 1-21-20  UR  [phone]    Ada Givens (39 y.o. Female)     Date of Birth Social Security Number Address Home Phone MRN    [id number]  [id number] [phone] [id number]    [employer] Marital Status          [employer]        Admission Date Admission Type Admitting Provider Attending Provider Department, Room/Bed    1/20/20 Elective John Vernon MD  [facility] 3A, 347/1    Discharge Date Discharge Disposition Discharge Destination        1/21/2020 Home or Self Care              Attending Provider:  (none)   Allergies:  No Known Allergies    Isolation:  None   Infection:  None   Code Status:  CPR    Ht:  181 cm (71.26\")   Wt:  140 kg (307 lb 12.2 oz)    Admission Cmt:  None   Principal Problem:  Displacement of cervical intervertebral disc without myelopathy [M50.20]                 Active Insurance as of 1/20/2020     Primary Coverage     Payor Plan Insurance Group Employer/Plan Group    [employer] [place] [employer] [place]ICA[place]      Payor Plan Address Payor Plan Phone Number Payor Plan Fax Number Effective Dates    [address] [address] [phone]  10/17/2018 - None Entered    [facility] [address]       Subscriber Name Subscriber Birth Date Member ID       ADA GIVENS 1980 [id number]                 Emergency Contacts      (Rel.) Home Phone Work Phone Mobile Phone    Sheree Hernandez (Mother) -- -- [phone]               Operative/Procedure Notes (all)      John Vernon MD at 01/20/20 0722  Version 1 of 1         Procedure Note  Preop Diagnosis: Displacement of cervical intervertebral disc without myelopathy [M50.20]    Post-Op Diagnosis Codes:     * Displacement of cervical intervertebral disc without myelopathy [M50.20]     Procedure Name:C5/6 anterior discectomy  C5/6 anterior interbody fusion with allograft  C5/6 partial corpectomies less than 50%  C5/6 anterior instrumented fusion  Using the operative " microscope          Indications:  A MRI revealed findings of Cervical Radiculopathy . The patient now presents for anterior cervical discectomy and fusion of C5/6 after discussing therapeutic alternatives.          Surgeon: John Vernon MD     Assistants: none    Anesthesia: General endotracheal anesthesia    ASA Class: 3    Procedure Details   After obtaining informed consent, having the risks and benefits of the procedure explained including but not limited to infection, bleeding, paralysis, spinal fluid leak, speech and swallow difficulty, stroke, coma, and death, the patient was brought to the operating room.  The patient was given general anesthesia via an endotracheal tube.  The patient was laid supine on the operating table.  The patient was placed in an occipital mandibular head harness and 5 pounds of axial traction.  A preplanned incision in the right anterior portion of the neck was marked with indelible marker.  Fluoroscopy confirmed the correct level of C5/6.  The patient was then prepped and draped in a standard sterile fashion.  The preplanned incision was infiltrated with Marcaine and epinephrine.  A 10 blade scalpel was used to make an incision to the dermis and epidermis.  Bovie cautery was used to extend the incision down to the subcutaneous and soft tissues to the level of the platysma.  The platysma was then split longitudinally using Metzenbaum scissors.  The connective tissue plane lateral to the esophagus and trachea medial to the carotid artery was then developed using blunt and sharp dissection.  Once the anterior portion of the vertebral bodies were identified a bent spinal needle was placed into the disc space at C5/6.  Fluoroscopy confirmed this was the correct level.  The longus colli were then dissected off the anterior lateral portions of the vertebral bodies using Bovie cautery.  The Shadow-Line retractor system was then placed into the wound.  At this point the operative  microscope was brought in.  Discectomies at each level were then conducted using a 15 blade scalpel to incise the annulus then a series of up-biting curettes, pituitary rongeurs and a 5 mm barrel bit were used.  Once the discectomy was completed the posterior longitudinal ligament at each level was identified. It was bluntly dissected using a nerve hook.  The PLL was then incised using a 1 mm Kerrison.  Bilateral foraminotomies at each level were then conducted using a 1.5 mm Kerrison.  Partial corpectomies of the posterior portion of the vertebral bodies of C5/6 were then done using a 2 mm Kerrison.  Once this was completed a 6 mm peek interbody spacer was tapped into place at each level.  The spacers were filled with allograft.   A series of  15 mm self drilling self-tapping screws were placed into the vertebral bodies.  Final AP and lateral fluoroscopy showed good position of all interbody devices and hardware.  The wound was then copiously irrigated with antibiotic solution.  The wound was inspected for hemostasis.  The subcutaneous tissues were then closed using a series of inverted interrupted 2-0 Vicryl sutures.  The skin was closed using running 4-0 Monocryl subcuticular.  All sponge, needle and instrument counts were correct at the end of the procedure.  The patient was extubated in stable condition and returned to recovery room with about 50 mL of blood loss.        Findings:  Cervical Radiculopathy    Estimated Blood Loss:  50           Drains: none           Total IV Fluids: ml           Specimens:   Specimens     ID Source Type Tests Collected By Collected At Frozen?      A Spine, Cervical Disc · TISSUE PATHOLOGY EXAM   John Vernon MD 1/20/20 0809 No     Description: DISC C5-6                 Implants:   Implant Name Type Inv. Item Serial No.  Lot No. LRB No. Used   PUTTY DBM FATIMAH 2.5CC - [id number] - [id number] Implant PUTTY DBM FATIMAH 2.5CC [id number] [employer]  N/A 1   CAGE  DIVERGENCE STANDALONE LRD [id number] - [id number] Implant CAGE DIVERGENCE STANDALONE LRD [id number]  [employer] [id number] N/A 1   SCRW SD SA 3.5X15MM PK/2 - [id number] Implant SCRW SD SA 3.5X15MM PK/2  [employer] [id number] N/A 1              Complications:  none           Disposition: PACU - hemodynamically stable.           Condition: stable        John Vernon MD    Electronically signed by John Vernon MD at 01/20/20 0942          Discharge Summary      Mehul Narvaez APRN at 01/21/20 0812            Date of Discharge:  1/21/2020    Discharge Diagnosis: Displacement of cervical intervertebral disc without myelopathy [M50.20]  Displacement of cervical intervertebral disc without myelopathy [M50.20]    Presenting Problem/History of Present Illness  Displacement of cervical intervertebral disc without myelopathy [M50.20]  Displacement of cervical intervertebral disc without myelopathy [M50.20]       Hospital Course  Patient is a 39 y.o. female presented with Displacement of cervical intervertebral disc without myelopathy [M50.20]  Displacement of cervical intervertebral disc without myelopathy [M50.20].  The patient has been through all manner of conservative care without any meaningful improvement. The patient who went to the operating room on January 20, 2020 for a C5-6 ACDF.  The patient tolerated procedure well.  She states her arm pain is resolved at this time.  Currently the patient is ambulating.  The patient is tolerating p.o.  The patient is voiding spontaneously.  It is felt that this time the patient is stable and suitable to be discharged home.  See orders for discharge instructions and restrictions.  The patient can take the dressing off in 72 hours and can get the wound wet at that time.  The patient is to clean the wound daily with soap and water.  They are to call the office with any drainage from the incision or worsening pain.  He is to wear the collar for 14 days and then may take the  collar off.  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    Procedures Performed  Procedure(s):  CERVICAL DISCECTOMY ANTERIOR WITH FUSION C5-6       Consults:   Consults     No orders found for last 30 day(s).            Condition on Discharge: Stable    Vital Signs  Temp:  [97.4 °F (36.3 °C)-98.2 °F (36.8 °C)] 97.4 °F (36.3 °C)  Heart Rate:  [72-90] 90  Resp:  [10-18] 18  BP: (137-197)/(72-96) 148/96    Physical Exam:   Physical Exam   Constitutional: She is oriented to person, place, and time. She appears well-developed and well-nourished.   HENT:   Head: Normocephalic.   Eyes: Pupils are equal, round, and reactive to light. EOM are normal.   Neck: Normal range of motion.   Pulmonary/Chest: Effort normal.   Musculoskeletal: Normal range of motion.        Cervical back: She exhibits pain.   Neurological: She is alert and oriented to person, place, and time. She has normal strength and normal reflexes. No cranial nerve deficit or sensory deficit. Gait normal. GCS eye subscore is 4. GCS verbal subscore is 5. GCS motor subscore is 6.   Skin: Skin is warm.   Psychiatric: She has a normal mood and affect. Her speech is normal and behavior is normal. Thought content normal.        Neurologic Exam     Mental Status   Oriented to person, place, and time.   Attention: normal. Concentration: normal.   Speech: speech is normal   Level of consciousness: alert  Normal comprehension.     Cranial Nerves   Cranial nerves II through XII intact.     CN III, IV, VI   Pupils are equal, round, and reactive to light.  Extraocular motions are normal.     Motor Exam   Muscle bulk: normal    Strength   Strength 5/5 throughout.     Sensory Exam   Light touch normal.          Discharge Disposition  Home or Self Care    Discharge Medications     Discharge Medications      New Medications      Instructions Start Date   HYDROcodone-acetaminophen 7.5-325 MG per tablet  Commonly  known as:  NORCO   1 tablet, Oral, Every 6 Hours PRN         Stop These Medications    diclofenac 75 MG EC tablet  Commonly known as:  VOLTAREN     IBU-200 PO        ASK your doctor about these medications      Instructions Start Date   tiZANidine 4 MG tablet  Commonly known as:  ZANAFLEX   TAKE 1 TABLET BY MOUTH EVERY 8 HOURS AS NEEDED FOR MUSCLE SPASMS             Discharge Diet:   Diet Instructions     Diet: Regular; Thin      Discharge Diet:  Regular    Fluid Consistency:  Thin          Activity at Discharge:   Activity Instructions     Other Instructions (Specify)      Activity Instructions: No strenuous activity, no driving until collar comes off. Wear collar for 14 days. May return to work in 1 week if doing well          Follow-up Appointments  No future appointments.  Additional Instructions for the Follow-ups that You Need to Schedule     Call MD With Problems / Concerns   As directed      Instructions: Worsening neck or arm pain, drainage from wound, fever, difficulty breathing or swallowing.    Order Comments:  Instructions: Worsening neck or arm pain, drainage from wound, fever, difficulty breathing or swallowing.          Discharge Follow-up with Specialty: ludy narvaez np; 3 Weeks   As directed      Specialty:  ludy narvaez np    Follow Up:  3 Weeks               Test Results Pending at Discharge   Order Current Status    Tissue Pathology Exam In process           MALI Valdez  01/21/20  8:12 AM    Time: Discharge 20 min      Electronically signed by Ludy Narvaez APRN at 01/21/20 0353

## 2020-01-21 NOTE — TELEPHONE ENCOUNTER
Patient left a voicemail asking if her Norco script could be changed to a smaller dose so her insurance will pay for it.    I will forward this to Mehul since he is the one who filled the Norco script      garcia hidalgo CMA

## 2020-01-21 NOTE — PLAN OF CARE
Problem: Patient Care Overview  Goal: Plan of Care Review  Outcome: Ongoing (interventions implemented as appropriate)  Flowsheets (Taken 1/21/2020 0518)  Progress: improving  Plan of Care Reviewed With: patient  Outcome Summary: No neuro changes.  Pt still has no c/o pain, except for the wounds on her tongue.  Cervical collar removed Q4 to assess neck then reapplied.  Oxygen WNL on RA.  Cont pulse ox and SCDs on.  Pt hopes to DC home today.  Pt has had elevated BP.  Dr. Vernon notified and one time dose of clonidine 0.1mg PO given.  SBP decreased from 180 to 160.

## 2020-01-21 NOTE — DISCHARGE SUMMARY
Date of Discharge:  1/21/2020    Discharge Diagnosis: Displacement of cervical intervertebral disc without myelopathy [M50.20]  Displacement of cervical intervertebral disc without myelopathy [M50.20]    Presenting Problem/History of Present Illness  Displacement of cervical intervertebral disc without myelopathy [M50.20]  Displacement of cervical intervertebral disc without myelopathy [M50.20]       Hospital Course  Patient is a 39 y.o. female presented with Displacement of cervical intervertebral disc without myelopathy [M50.20]  Displacement of cervical intervertebral disc without myelopathy [M50.20].  The patient has been through all manner of conservative care without any meaningful improvement. The patient who went to the operating room on January 20, 2020 for a C5-6 ACDF.  The patient tolerated procedure well.  She states her arm pain is resolved at this time.  Currently the patient is ambulating.  The patient is tolerating p.o.  The patient is voiding spontaneously.  It is felt that this time the patient is stable and suitable to be discharged home.  See orders for discharge instructions and restrictions.  The patient can take the dressing off in 72 hours and can get the wound wet at that time.  The patient is to clean the wound daily with soap and water.  They are to call the office with any drainage from the incision or worsening pain.  He is to wear the collar for 14 days and then may take the collar off.  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    Procedures Performed  Procedure(s):  CERVICAL DISCECTOMY ANTERIOR WITH FUSION C5-6       Consults:   Consults     No orders found for last 30 day(s).            Condition on Discharge: Stable    Vital Signs  Temp:  [97.4 °F (36.3 °C)-98.2 °F (36.8 °C)] 97.4 °F (36.3 °C)  Heart Rate:  [72-90] 90  Resp:  [10-18] 18  BP: (137-197)/(72-96) 148/96    Physical Exam:   Physical Exam    Constitutional: She is oriented to person, place, and time. She appears well-developed and well-nourished.   HENT:   Head: Normocephalic.   Eyes: Pupils are equal, round, and reactive to light. EOM are normal.   Neck: Normal range of motion.   Pulmonary/Chest: Effort normal.   Musculoskeletal: Normal range of motion.        Cervical back: She exhibits pain.   Neurological: She is alert and oriented to person, place, and time. She has normal strength and normal reflexes. No cranial nerve deficit or sensory deficit. Gait normal. GCS eye subscore is 4. GCS verbal subscore is 5. GCS motor subscore is 6.   Skin: Skin is warm.   Psychiatric: She has a normal mood and affect. Her speech is normal and behavior is normal. Thought content normal.        Neurologic Exam     Mental Status   Oriented to person, place, and time.   Attention: normal. Concentration: normal.   Speech: speech is normal   Level of consciousness: alert  Normal comprehension.     Cranial Nerves   Cranial nerves II through XII intact.     CN III, IV, VI   Pupils are equal, round, and reactive to light.  Extraocular motions are normal.     Motor Exam   Muscle bulk: normal    Strength   Strength 5/5 throughout.     Sensory Exam   Light touch normal.          Discharge Disposition  Home or Self Care    Discharge Medications     Discharge Medications      New Medications      Instructions Start Date   HYDROcodone-acetaminophen 7.5-325 MG per tablet  Commonly known as:  NORCO   1 tablet, Oral, Every 6 Hours PRN         Stop These Medications    diclofenac 75 MG EC tablet  Commonly known as:  VOLTAREN     IBU-200 PO        ASK your doctor about these medications      Instructions Start Date   tiZANidine 4 MG tablet  Commonly known as:  ZANAFLEX   TAKE 1 TABLET BY MOUTH EVERY 8 HOURS AS NEEDED FOR MUSCLE SPASMS             Discharge Diet:   Diet Instructions     Diet: Regular; Thin      Discharge Diet:  Regular    Fluid Consistency:  Thin          Activity at  Discharge:   Activity Instructions     Other Instructions (Specify)      Activity Instructions: No strenuous activity, no driving until collar comes off. Wear collar for 14 days. May return to work in 1 week if doing well          Follow-up Appointments  No future appointments.  Additional Instructions for the Follow-ups that You Need to Schedule     Call MD With Problems / Concerns   As directed      Instructions: Worsening neck or arm pain, drainage from wound, fever, difficulty breathing or swallowing.    Order Comments:  Instructions: Worsening neck or arm pain, drainage from wound, fever, difficulty breathing or swallowing.          Discharge Follow-up with Specialty: ludy narvaez np; 3 Weeks   As directed      Specialty:  ludy narvaez np    Follow Up:  3 Weeks               Test Results Pending at Discharge   Order Current Status    Tissue Pathology Exam In process           Ludy Narvaez, APRN  01/21/20  8:12 AM    Time: Discharge 20 min

## 2020-01-25 LAB
CYTO UR: NORMAL
LAB AP CASE REPORT: NORMAL
PATH REPORT.FINAL DX SPEC: NORMAL
PATH REPORT.GROSS SPEC: NORMAL

## 2020-01-31 ENCOUNTER — OFFICE VISIT (OUTPATIENT)
Dept: INTERNAL MEDICINE | Facility: CLINIC | Age: 40
End: 2020-01-31

## 2020-01-31 ENCOUNTER — LAB (OUTPATIENT)
Dept: LAB | Facility: HOSPITAL | Age: 40
End: 2020-01-31

## 2020-01-31 VITALS
TEMPERATURE: 98.3 F | WEIGHT: 293 LBS | RESPIRATION RATE: 18 BRPM | BODY MASS INDEX: 41.02 KG/M2 | SYSTOLIC BLOOD PRESSURE: 158 MMHG | DIASTOLIC BLOOD PRESSURE: 88 MMHG | HEART RATE: 87 BPM | OXYGEN SATURATION: 100 % | HEIGHT: 71 IN

## 2020-01-31 DIAGNOSIS — E11.65 TYPE 2 DIABETES MELLITUS WITH HYPERGLYCEMIA, WITHOUT LONG-TERM CURRENT USE OF INSULIN (HCC): Primary | ICD-10-CM

## 2020-01-31 DIAGNOSIS — R03.0 ELEVATED BP WITHOUT DIAGNOSIS OF HYPERTENSION: ICD-10-CM

## 2020-01-31 DIAGNOSIS — Z00.00 HEALTHCARE MAINTENANCE: ICD-10-CM

## 2020-01-31 DIAGNOSIS — R73.9 HYPERGLYCEMIA: ICD-10-CM

## 2020-01-31 LAB — HBA1C MFR BLD: 7 %

## 2020-01-31 PROCEDURE — 36415 COLL VENOUS BLD VENIPUNCTURE: CPT

## 2020-01-31 PROCEDURE — 83036 HEMOGLOBIN GLYCOSYLATED A1C: CPT | Performed by: INTERNAL MEDICINE

## 2020-01-31 PROCEDURE — 80061 LIPID PANEL: CPT | Performed by: INTERNAL MEDICINE

## 2020-01-31 PROCEDURE — 99204 OFFICE O/P NEW MOD 45 MIN: CPT | Performed by: INTERNAL MEDICINE

## 2020-01-31 RX ORDER — ACETAMINOPHEN 500 MG
500 TABLET ORAL EVERY 6 HOURS PRN
COMMUNITY
End: 2020-11-16

## 2020-01-31 RX ORDER — LISINOPRIL 10 MG/1
10 TABLET ORAL DAILY
Qty: 30 TABLET | Refills: 5 | Status: SHIPPED | OUTPATIENT
Start: 2020-01-31 | End: 2020-07-21

## 2020-01-31 NOTE — PATIENT INSTRUCTIONS
"-take metformin once a day for one week then start taking it twice a day  -start taking lisinopril once a day    DASH Eating Plan  DASH stands for \"Dietary Approaches to Stop Hypertension.\" The DASH eating plan is a healthy eating plan that has been shown to reduce high blood pressure (hypertension). It may also reduce your risk for type 2 diabetes, heart disease, and stroke. The DASH eating plan may also help with weight loss.  What are tips for following this plan?    General guidelines  · Avoid eating more than 2,300 mg (milligrams) of salt (sodium) a day. If you have hypertension, you may need to reduce your sodium intake to 1,500 mg a day.  · Limit alcohol intake to no more than 1 drink a day for nonpregnant women and 2 drinks a day for men. One drink equals 12 oz of beer, 5 oz of wine, or 1½ oz of hard liquor.  · Work with your health care provider to maintain a healthy body weight or to lose weight. Ask what an ideal weight is for you.  · Get at least 30 minutes of exercise that causes your heart to beat faster (aerobic exercise) most days of the week. Activities may include walking, swimming, or biking.  · Work with your health care provider or diet and nutrition specialist (dietitian) to adjust your eating plan to your individual calorie needs.  Reading food labels    · Check food labels for the amount of sodium per serving. Choose foods with less than 5 percent of the Daily Value of sodium. Generally, foods with less than 300 mg of sodium per serving fit into this eating plan.  · To find whole grains, look for the word \"whole\" as the first word in the ingredient list.  Shopping  · Buy products labeled as \"low-sodium\" or \"no salt added.\"  · Buy fresh foods. Avoid canned foods and premade or frozen meals.  Cooking  · Avoid adding salt when cooking. Use salt-free seasonings or herbs instead of table salt or sea salt. Check with your health care provider or pharmacist before using salt substitutes.  · Do not " amin foods. Cook foods using healthy methods such as baking, boiling, grilling, and broiling instead.  · Cook with heart-healthy oils, such as olive, canola, soybean, or sunflower oil.  Meal planning  · Eat a balanced diet that includes:  ? 5 or more servings of fruits and vegetables each day. At each meal, try to fill half of your plate with fruits and vegetables.  ? Up to 6-8 servings of whole grains each day.  ? Less than 6 oz of lean meat, poultry, or fish each day. A 3-oz serving of meat is about the same size as a deck of cards. One egg equals 1 oz.  ? 2 servings of low-fat dairy each day.  ? A serving of nuts, seeds, or beans 5 times each week.  ? Heart-healthy fats. Healthy fats called Omega-3 fatty acids are found in foods such as flaxseeds and coldwater fish, like sardines, salmon, and mackerel.  · Limit how much you eat of the following:  ? Canned or prepackaged foods.  ? Food that is high in trans fat, such as fried foods.  ? Food that is high in saturated fat, such as fatty meat.  ? Sweets, desserts, sugary drinks, and other foods with added sugar.  ? Full-fat dairy products.  · Do not salt foods before eating.  · Try to eat at least 2 vegetarian meals each week.  · Eat more home-cooked food and less restaurant, buffet, and fast food.  · When eating at a restaurant, ask that your food be prepared with less salt or no salt, if possible.  What foods are recommended?  The items listed may not be a complete list. Talk with your dietitian about what dietary choices are best for you.  Grains  Whole-grain or whole-wheat bread. Whole-grain or whole-wheat pasta. Brown rice. Oatmeal. Quinoa. Bulgur. Whole-grain and low-sodium cereals. Humaira bread. Low-fat, low-sodium crackers. Whole-wheat flour tortillas.  Vegetables  Fresh or frozen vegetables (raw, steamed, roasted, or grilled). Low-sodium or reduced-sodium tomato and vegetable juice. Low-sodium or reduced-sodium tomato sauce and tomato paste. Low-sodium or  reduced-sodium canned vegetables.  Fruits  All fresh, dried, or frozen fruit. Canned fruit in natural juice (without added sugar).  Meat and other protein foods  Skinless chicken or turkey. Ground chicken or turkey. Pork with fat trimmed off. Fish and seafood. Egg whites. Dried beans, peas, or lentils. Unsalted nuts, nut butters, and seeds. Unsalted canned beans. Lean cuts of beef with fat trimmed off. Low-sodium, lean deli meat.  Dairy  Low-fat (1%) or fat-free (skim) milk. Fat-free, low-fat, or reduced-fat cheeses. Nonfat, low-sodium ricotta or cottage cheese. Low-fat or nonfat yogurt. Low-fat, low-sodium cheese.  Fats and oils  Soft margarine without trans fats. Vegetable oil. Low-fat, reduced-fat, or light mayonnaise and salad dressings (reduced-sodium). Canola, safflower, olive, soybean, and sunflower oils. Avocado.  Seasoning and other foods  Herbs. Spices. Seasoning mixes without salt. Unsalted popcorn and pretzels. Fat-free sweets.  What foods are not recommended?  The items listed may not be a complete list. Talk with your dietitian about what dietary choices are best for you.  Grains  Baked goods made with fat, such as croissants, muffins, or some breads. Dry pasta or rice meal packs.  Vegetables  Creamed or fried vegetables. Vegetables in a cheese sauce. Regular canned vegetables (not low-sodium or reduced-sodium). Regular canned tomato sauce and paste (not low-sodium or reduced-sodium). Regular tomato and vegetable juice (not low-sodium or reduced-sodium). Pickles. Olives.  Fruits  Canned fruit in a light or heavy syrup. Fried fruit. Fruit in cream or butter sauce.  Meat and other protein foods  Fatty cuts of meat. Ribs. Fried meat. Campbell. Sausage. Bologna and other processed lunch meats. Salami. Fatback. Hotdogs. Bratwurst. Salted nuts and seeds. Canned beans with added salt. Canned or smoked fish. Whole eggs or egg yolks. Chicken or turkey with skin.  Dairy  Whole or 2% milk, cream, and half-and-half.  Whole or full-fat cream cheese. Whole-fat or sweetened yogurt. Full-fat cheese. Nondairy creamers. Whipped toppings. Processed cheese and cheese spreads.  Fats and oils  Butter. Stick margarine. Lard. Shortening. Ghee. Campbell fat. Tropical oils, such as coconut, palm kernel, or palm oil.  Seasoning and other foods  Salted popcorn and pretzels. Onion salt, garlic salt, seasoned salt, table salt, and sea salt. Worcestershire sauce. Tartar sauce. Barbecue sauce. Teriyaki sauce. Soy sauce, including reduced-sodium. Steak sauce. Canned and packaged gravies. Fish sauce. Oyster sauce. Cocktail sauce. Horseradish that you find on the shelf. Ketchup. Mustard. Meat flavorings and tenderizers. Bouillon cubes. Hot sauce and Tabasco sauce. Premade or packaged marinades. Premade or packaged taco seasonings. Relishes. Regular salad dressings.  Where to find more information:  · National Heart, Lung, and Blood [place]: www.nhlbi.nih.gov  · American Heart Association: www.heart.org  Summary  · The DASH eating plan is a healthy eating plan that has been shown to reduce high blood pressure (hypertension). It may also reduce your risk for type 2 diabetes, heart disease, and stroke.  · With the DASH eating plan, you should limit salt (sodium) intake to 2,300 mg a day. If you have hypertension, you may need to reduce your sodium intake to 1,500 mg a day.  · When on the DASH eating plan, aim to eat more fresh fruits and vegetables, whole grains, lean proteins, low-fat dairy, and heart-healthy fats.  · Work with your health care provider or diet and nutrition specialist (dietitian) to adjust your eating plan to your individual calorie needs.  This information is not intended to replace advice given to you by your health care provider. Make sure you discuss any questions you have with your health care provider.  Document Released: 12/06/2012 Document Revised: 12/11/2017 Document Reviewed: 12/11/2017  [employer] Patient Education  © 2019 [employer] Inc.    Preventing Hypertension  Hypertension, commonly called high blood pressure, is when the force of blood pumping through the arteries is too strong. Arteries are blood vessels that carry blood from the heart throughout the body. Over time, hypertension can damage the arteries and decrease blood flow to important parts of the body, including the brain, heart, and kidneys. Often, hypertension does not cause symptoms until blood pressure is very high. For this reason, it is important to have your blood pressure checked on a regular basis.  Hypertension can often be prevented with diet and lifestyle changes. If you already have hypertension, you can control it with diet and lifestyle changes, as well as medicine.  What nutrition changes can be made?  Maintain a healthy diet. This includes:  · Eating less salt (sodium). Ask your health care provider how much sodium is safe for you to have. The general recommendation is to consume less than 1 tsp (2,300 mg) of sodium a day.  ? Do not add salt to your food.  ? Choose low-sodium options when grocery shopping and eating out.  · Limiting fats in your diet. You can do this by eating low-fat or fat-free dairy products and by eating less red meat.  · Eating more fruits, vegetables, and whole grains. Make a goal to eat:  ? 1½-2 cups of fresh fruits and vegetables each day.  ? 3-4 servings of whole grains each day.  · Avoiding foods and beverages that have added sugars.  · Eating fish that contain healthy fats (omega-3 fatty acids), such as mackerel or salmon.  If you need help putting together a healthy eating plan, try the DASH diet. This diet is high in fruits, vegetables, and whole grains. It is low in sodium, red meat, and added sugars. DASH stands for Dietary Approaches to Stop Hypertension.  What lifestyle changes can be made?    · Lose weight if you are overweight. Losing just 3?5% of your body weight can help prevent or control hypertension.  ? For  example, if your present weight is 200 lb (91 kg), a loss of 3-5% of your weight means losing 6-10 lb (2.7-4.5 kg).  ? Ask your health care provider to help you with a diet and exercise plan to safely lose weight.  · Get enough exercise. Do at least 150 minutes of moderate-intensity exercise each week.  ? You could do this in short exercise sessions several times a day, or you could do longer exercise sessions a few times a week. For example, you could take a brisk 10-minute walk or bike ride, 3 times a day, for 5 days a week.  · Find ways to reduce stress, such as exercising, meditating, listening to music, or taking a yoga class. If you need help reducing stress, ask your health care provider.  · Do not smoke. This includes e-cigarettes. Chemicals in tobacco and nicotine products raise your blood pressure each time you smoke. If you need help quitting, ask your health care provider.  · Avoid alcohol. If you drink alcohol, limit alcohol intake to no more than 1 drink a day for nonpregnant women and 2 drinks a day for men. One drink equals 12 oz of beer, 5 oz of wine, or 1½ oz of hard liquor.  Why are these changes important?  Diet and lifestyle changes can help you prevent hypertension, and they may make you feel better overall and improve your quality of life. If you have hypertension, making these changes will help you control it and help prevent major complications, such as:  · Hardening and narrowing of arteries that supply blood to:  ? Your heart. This can cause a heart attack.  ? Your brain. This can cause a stroke.  ? Your kidneys. This can cause kidney failure.  · Stress on your heart muscle, which can cause heart failure.  What can I do to lower my risk?  · Work with your health care provider to make a hypertension prevention plan that works for you. Follow your plan and keep all follow-up visits as told by your health care provider.  · Learn how to check your blood pressure at home. Make sure that you  know your personal target blood pressure, as told by your health care provider.  How is this treated?  In addition to diet and lifestyle changes, your health care provider may recommend medicines to help lower your blood pressure. You may need to try a few different medicines to find what works best for you. You also may need to take more than one medicine. Take over-the-counter and prescription medicines only as told by your health care provider.  Where to find support  Your health care provider can help you prevent hypertension and help you keep your blood pressure at a healthy level. Your local hospital or your community may also provide support services and prevention programs.  The American Heart Association offers an online support network at: http://supportnetwork.heart.org/high-blood-pressure  Where to find more information  Learn more about hypertension from:  · National Heart, Lung, and Blood [place]: www.nhlbi.nih.gov/health/health-topics/topics/hbp  · Centers for Disease Control and Prevention: www.cdc.gov/bloodpressure  · American Academy of Family Physicians: http://familydoctor.org/familydoctor/en/diseases-conditions/high-blood-pressure.printerview.all.html  Learn more about the DASH diet from:  · National Heart, Lung, and Blood [place]: www.nhlbi.nih.gov/health/health-topics/topics/dash  Contact a health care provider if:  · You think you are having a reaction to medicines you have taken.  · You have recurrent headaches or feel dizzy.  · You have swelling in your ankles.  · You have trouble with your vision.  Summary  · Hypertension often does not cause any symptoms until blood pressure is very high. It is important to get your blood pressure checked regularly.  · Diet and lifestyle changes are the most important steps in preventing hypertension.  · By keeping your blood pressure in a healthy range, you can prevent complications like heart attack, heart failure, stroke, and kidney  failure.  · Work with your health care provider to make a hypertension prevention plan that works for you.  This information is not intended to replace advice given to you by your health care provider. Make sure you discuss any questions you have with your health care provider.  Document Released: 01/01/2017 Document Revised: 08/28/2017 Document Reviewed: 08/28/2017  [employer] Interactive Patient Education © 2019 Elsevier Inc.

## 2020-01-31 NOTE — PROGRESS NOTES
Chief Complaint   Patient presents with   • Establish Care         History:  Dipika Zendejas is a 39 y.o. female who presents today for evaluation of the above problems.    She is here to establish care.  On January 20, 2020 she had cervical discectomy anterior fusion at C5-C6 for herniated disc.  She has been seeing Dr. garcia for the neuropathic pain and was getting gabapentin.  She did well postoperatively and was discharged without any complications.    She has not seen a physician in a couple years.  Her I reviewed previous labs during the hospitalization and preop which showed hyperglycemia.  She does not have a diagnosis of type 2 diabetes but diabetes runs in the family.  Her blood pressure is elevated today but she does not have hypertension.  It was elevated in the hospital but when she checks it every 3 months while giving blood it is usually 130 systolic.  HPI    ROS:  Review of Systems   Constitutional: Negative for activity change, appetite change, fatigue, fever and unexpected weight change.   HENT: Negative for nosebleeds, sore throat and trouble swallowing.    Eyes: Negative for pain and visual disturbance.   Respiratory: Negative for cough, chest tightness and shortness of breath.    Cardiovascular: Negative for chest pain, palpitations and leg swelling.   Gastrointestinal: Negative for abdominal pain, constipation, diarrhea, nausea and vomiting.   Endocrine: Negative for cold intolerance and heat intolerance.   Genitourinary: Negative for hematuria.   Musculoskeletal: Negative.  Negative for back pain, neck pain and neck stiffness.   Skin: Negative for rash and wound.   Neurological: Negative for dizziness, syncope, weakness, light-headedness and headaches.   Hematological: Negative for adenopathy. Does not bruise/bleed easily.   Psychiatric/Behavioral: Negative for agitation, behavioral problems and confusion.       No Known Allergies  Past Medical History:   Diagnosis Date   • Neck pain    • No  known health problems    • Numbness and tingling in right hand 2018     Past Surgical History:   Procedure Laterality Date   • ANTERIOR CERVICAL DISCECTOMY W/ FUSION N/A 1/20/2020    Procedure: CERVICAL DISCECTOMY ANTERIOR WITH FUSION C5-6;  Surgeon: John Vernon MD;  Location: [facility];  Service: Neurosurgery   • CARPAL TUNNEL RELEASE Bilateral    • ULNAR NERVE DECOMPRESSION Left      Family History   Problem Relation Age of Onset   • Arthritis Mother    • Diabetes Mother    • Heart disease Father    • No Known Problems Brother      Dipika  reports that she has never smoked. She has never used smokeless tobacco. She reports that she does not drink alcohol or use drugs.    I have reviewed and updated the above documentation (if necessary) including but not limited to chief complaint, ROS, PFSH, allergies and medications        Current Outpatient Medications:   •  acetaminophen (TYLENOL) 500 MG tablet, Take 500 mg by mouth Every 6 (Six) Hours As Needed for Mild Pain  (as needed for pain)., Disp: , Rfl:   •  tiZANidine (ZANAFLEX) 4 MG tablet, TAKE 1 TABLET BY MOUTH EVERY 8 HOURS AS NEEDED FOR MUSCLE SPASMS, Disp: 90 tablet, Rfl: 0  •  HYDROcodone-acetaminophen (NORCO) 7.5-325 MG per tablet, Take 1 tablet by mouth Every 6 (Six) Hours As Needed for Moderate Pain  for up to 30 days., Disp: 120 tablet, Rfl: 0  •  lisinopril (PRINIVIL,ZESTRIL) 10 MG tablet, Take 1 tablet by mouth Daily., Disp: 30 tablet, Rfl: 5  •  metFORMIN (GLUCOPHAGE) 500 MG tablet, Take 1 tablet by mouth 2 (Two) Times a Day With Meals., Disp: 60 tablet, Rfl: 5    PHQ-9 Depression Screening  Little interest or pleasure in doing things? 0   Feeling down, depressed, or hopeless? 0   Trouble falling or staying asleep, or sleeping too much?     Feeling tired or having little energy?     Poor appetite or overeating?     Feeling bad about yourself - or that you are a failure or have let yourself or your family down?     Trouble concentrating on things,  "such as reading the newspaper or watching television?     Moving or speaking so slowly that other people could have noticed? Or the opposite - being so fidgety or restless that you have been moving around a lot more than usual?     Thoughts that you would be better off dead, or of hurting yourself in some way?     PHQ-9 Total Score 0   If you checked off any problems, how difficult have these problems made it for you to do your work, take care of things at home, or get along with other people?       PHQ-9 Total Score: 0    OBJECTIVE:  Visit Vitals  /88 (BP Location: Left arm, Patient Position: Sitting)   Pulse 87   Temp 98.3 °F (36.8 °C) (Oral)   Resp 18   Ht 181 cm (71.26\")   Wt 134 kg (296 lb)   LMP 01/12/2020   SpO2 100%   Breastfeeding No   BMI 40.98 kg/m²      Physical Exam   Constitutional: She is oriented to person, place, and time. She appears well-developed and well-nourished. No distress.   HENT:   Head: Normocephalic and atraumatic.   Mouth/Throat: Oropharynx is clear and moist. No oropharyngeal exudate.   Eyes: Pupils are equal, round, and reactive to light. Conjunctivae and EOM are normal. No scleral icterus.   Neck: Normal range of motion. Neck supple. No JVD present. No thyromegaly present.   Hard collar but she removed it.  There is a well-healing anterior cervical incision with clean/dry Steri-Strips   Cardiovascular: Normal rate, regular rhythm and normal heart sounds. Exam reveals no gallop and no friction rub.   No murmur heard.  Pulmonary/Chest: Effort normal and breath sounds normal. No stridor. She has no wheezes. She has no rales.   Abdominal: Soft. Bowel sounds are normal. She exhibits no distension. There is no tenderness. There is no rebound and no guarding.   Musculoskeletal: She exhibits no edema or tenderness.   Lymphadenopathy:     She has no cervical adenopathy.   Neurological: She is alert and oriented to person, place, and time. No cranial nerve deficit.   Skin: Skin is warm " and dry.   Psychiatric: She has a normal mood and affect. Her behavior is normal.       MDM  Number of Diagnoses or Management Options  BMI 40.0-44.9, adult (CMS/[facility]): new, no workup  Elevated BP without diagnosis of hypertension: new, needed workup  Healthcare maintenance: new, needed workup  Hyperglycemia: new, needed workup  Type 2 diabetes mellitus with hyperglycemia, without long-term current use of insulin (CMS/[facility]): new, needed workup     Amount and/or Complexity of Data Reviewed  Clinical lab tests: ordered and reviewed  Tests in the medicine section of CPT®: ordered and reviewed  Review and summarize past medical records: yes    Risk of Complications, Morbidity, and/or Mortality  Presenting problems: moderate  Diagnostic procedures: moderate  Management options: moderate        Assessment/Plan    Dipika was seen today for establish care.    Diagnoses and all orders for this visit:    Type 2 diabetes mellitus with hyperglycemia, without long-term current use of insulin (CMS/[facility])  -     metFORMIN (GLUCOPHAGE) 500 MG tablet; Take 1 tablet by mouth 2 (Two) Times a Day With Meals.    Healthcare maintenance  -     Cancel: Comprehensive Metabolic Panel; Future  -     Lipid Panel; Future    Elevated BP without diagnosis of hypertension    Hyperglycemia  -     POC Glycosylated Hemoglobin (Hb A1C)    BMI 40.0-44.9, adult (CMS/[facility])    Other orders  -     lisinopril (PRINIVIL,ZESTRIL) 10 MG tablet; Take 1 tablet by mouth Daily.      She has a new diagnosis of type 2 diabetes based on A1c today of 7.0.  I had a lengthy discussion regarding this new diagnosis and gave her information.  She like to avoid diabetic education at this time.  Want to prescribe metformin 500 mg twice a day.  I am also prescribing a low-dose lisinopril given her hypertension at the office today especially with her new diagnosis of diabetes.  We discussed diet and exercise and how weight loss would affect her blood sugar.  She is doing well from a  cervical fusion standpoint and does not have any pain at all.    She declined influenza vaccine today    Patient's Body mass index is 40.98 kg/m². BMI is above normal parameters. Recommendations include: educational material, exercise counseling and nutrition counseling.      Education materials and an After Visit Summary were printed and given to the patient at discharge.  Return in about 3 months (around 4/30/2020).         MELI Schmidt MD   2:07 PM  1/31/2020

## 2020-01-31 NOTE — PROGRESS NOTES
Chief Complaint   Patient presents with   • Establish Care         History:  Dipika Ford is a 39 y.o. female who presents today for evaluation of the above problems.      HPI    ROS:  Review of Systems    No Known Allergies  Past Medical History:   Diagnosis Date   • Neck pain    • No known health problems    • Numbness and tingling in right hand 2018     Past Surgical History:   Procedure Laterality Date   • ANTERIOR CERVICAL DISCECTOMY W/ FUSION N/A 1/20/2020    Procedure: CERVICAL DISCECTOMY ANTERIOR WITH FUSION C5-6;  Surgeon: John Vernon MD;  Location: [facility];  Service: Neurosurgery   • CARPAL TUNNEL RELEASE Bilateral    • ULNAR NERVE DECOMPRESSION Left      Family History   Problem Relation Age of Onset   • Arthritis Mother    • Diabetes Mother    • Heart disease Father    • No Known Problems Brother      Dipika  reports that she has never smoked. She has never used smokeless tobacco. She reports that she does not drink alcohol or use drugs.    I have reviewed and updated the above documentation (if necessary) including but not limited to chief complaint, ROS, PFSH, allergies and medications        Current Outpatient Medications:   •  acetaminophen (TYLENOL) 500 MG tablet, Take 500 mg by mouth Every 6 (Six) Hours As Needed for Mild Pain  (as needed for pain)., Disp: , Rfl:   •  tiZANidine (ZANAFLEX) 4 MG tablet, TAKE 1 TABLET BY MOUTH EVERY 8 HOURS AS NEEDED FOR MUSCLE SPASMS, Disp: 90 tablet, Rfl: 0  •  HYDROcodone-acetaminophen (NORCO) 7.5-325 MG per tablet, Take 1 tablet by mouth Every 6 (Six) Hours As Needed for Moderate Pain  for up to 30 days., Disp: 120 tablet, Rfl: 0  •  lisinopril (PRINIVIL,ZESTRIL) 10 MG tablet, Take 1 tablet by mouth Daily., Disp: 30 tablet, Rfl: 5  •  metFORMIN (GLUCOPHAGE) 500 MG tablet, Take 1 tablet by mouth 2 (Two) Times a Day With Meals., Disp: 60 tablet, Rfl: 5    Current outpatient and discharge medications have been reconciled for the patient.  Reviewed by:  "Chaitanya Schmidt MD      OBJECTIVE:  Visit Vitals  /88 (BP Location: Left arm, Patient Position: Sitting)   Pulse 87   Temp 98.3 °F (36.8 °C) (Oral)   Resp 18   Ht 181 cm (71.26\")   Wt 134 kg (296 lb)   LMP 01/12/2020   SpO2 100%   Breastfeeding No   BMI 40.98 kg/m²      Physical Exam    MDM    Assessment/Plan    Dipika was seen today for establish care.    Diagnoses and all orders for this visit:    Healthcare maintenance  -     Cancel: Comprehensive Metabolic Panel; Future  -     Lipid Panel; Future    Elevated BP without diagnosis of hypertension    Hyperglycemia  -     POC Glycosylated Hemoglobin (Hb A1C)    Type 2 diabetes mellitus with hyperglycemia, without long-term current use of insulin (CMS/[facility])  -     metFORMIN (GLUCOPHAGE) 500 MG tablet; Take 1 tablet by mouth 2 (Two) Times a Day With Meals.    Other orders  -     lisinopril (PRINIVIL,ZESTRIL) 10 MG tablet; Take 1 tablet by mouth Daily.        Patient's Body mass index is 40.98 kg/m². BMI is {BMI range:49350}.      Education materials and an After Visit Summary were printed and given to the patient at discharge.  No follow-ups on file.         MELI Schmidt MD   1:36 PM  1/31/2020   "

## 2020-02-01 DIAGNOSIS — R20.0 NUMBNESS AND TINGLING OF RIGHT UPPER EXTREMITY: ICD-10-CM

## 2020-02-01 DIAGNOSIS — R20.2 NUMBNESS AND TINGLING OF RIGHT UPPER EXTREMITY: ICD-10-CM

## 2020-02-01 LAB
CHOLEST SERPL-MCNC: 196 MG/DL (ref 0–200)
HDLC SERPL-MCNC: 40 MG/DL (ref 40–60)
LDLC SERPL CALC-MCNC: 113 MG/DL (ref 0–100)
LDLC/HDLC SERPL: 2.84 {RATIO}
TRIGL SERPL-MCNC: 213 MG/DL (ref 0–150)
VLDLC SERPL-MCNC: 42.6 MG/DL (ref 5–40)

## 2020-02-03 ENCOUNTER — TELEPHONE (OUTPATIENT)
Dept: NEUROSURGERY | Facility: CLINIC | Age: 40
End: 2020-02-03

## 2020-02-03 ENCOUNTER — HOSPITAL ENCOUNTER (OUTPATIENT)
Dept: GENERAL RADIOLOGY | Facility: HOSPITAL | Age: 40
Discharge: HOME | End: 2020-02-03
Admitting: NURSE PRACTITIONER

## 2020-02-03 DIAGNOSIS — M50.20 DISPLACEMENT OF CERVICAL INTERVERTEBRAL DISC WITHOUT MYELOPATHY: Primary | ICD-10-CM

## 2020-02-03 DIAGNOSIS — E78.2 MIXED HYPERLIPIDEMIA: Primary | ICD-10-CM

## 2020-02-03 DIAGNOSIS — M50.20 DISPLACEMENT OF CERVICAL INTERVERTEBRAL DISC WITHOUT MYELOPATHY: ICD-10-CM

## 2020-02-03 PROCEDURE — 72020 X-RAY EXAM OF SPINE 1 VIEW: CPT

## 2020-02-03 RX ORDER — TIZANIDINE 4 MG/1
TABLET ORAL
Qty: 90 TABLET | Refills: 0 | Status: SHIPPED | OUTPATIENT
Start: 2020-02-03 | End: 2020-11-16 | Stop reason: SDUPTHER

## 2020-02-03 RX ORDER — ATORVASTATIN CALCIUM 20 MG/1
20 TABLET, FILM COATED ORAL DAILY
Qty: 30 TABLET | Refills: 5 | Status: SHIPPED | OUTPATIENT
Start: 2020-02-03 | End: 2020-12-15 | Stop reason: SDUPTHER

## 2020-02-03 NOTE — TELEPHONE ENCOUNTER
Patient calls stating she took her collar off last night and took a pill & noticed the feeling of something in her throat; but she states when she has the collar on she doesn't have this feeling.  She says it is the same with food - better with collar on and has this feeling of something being stuck in her throat when she is out of the collar.    Told her I would forward to Mehul hidalgo CMA

## 2020-02-03 NOTE — TELEPHONE ENCOUNTER
Carmen:  Have the patient go and get a lateral cervical spine xray today.    Left voicemail for patient b/c she didn't answer.      garcia hidalgo CMA

## 2020-02-04 NOTE — TELEPHONE ENCOUNTER
Tried again to reach patient to see if she got the message to have her x-ray, no answer, I have left another message asking her to call the office back.

## 2020-02-04 NOTE — TELEPHONE ENCOUNTER
Dipika has called back and she did have her x-ray yesterday, please review and call patient with the results.

## 2020-02-04 NOTE — TELEPHONE ENCOUNTER
Called patient with her x-ray results, explained the x-ray looks good, give the symptoms more time and she may now come out of her collar.   She can call back with any issues or questions.

## 2020-02-13 ENCOUNTER — OFFICE VISIT (OUTPATIENT)
Dept: NEUROSURGERY | Facility: CLINIC | Age: 40
End: 2020-02-13

## 2020-02-13 ENCOUNTER — HOSPITAL ENCOUNTER (OUTPATIENT)
Dept: GENERAL RADIOLOGY | Facility: HOSPITAL | Age: 40
Discharge: HOME | End: 2020-02-13
Admitting: NURSE PRACTITIONER

## 2020-02-13 ENCOUNTER — TELEPHONE (OUTPATIENT)
Dept: NEUROSURGERY | Facility: CLINIC | Age: 40
End: 2020-02-13

## 2020-02-13 VITALS
SYSTOLIC BLOOD PRESSURE: 150 MMHG | WEIGHT: 293 LBS | DIASTOLIC BLOOD PRESSURE: 82 MMHG | HEIGHT: 72 IN | BODY MASS INDEX: 39.68 KG/M2

## 2020-02-13 DIAGNOSIS — M50.20 DISPLACEMENT OF CERVICAL INTERVERTEBRAL DISC WITHOUT MYELOPATHY: Primary | ICD-10-CM

## 2020-02-13 DIAGNOSIS — Z78.9 NON-SMOKER: ICD-10-CM

## 2020-02-13 DIAGNOSIS — M50.20 DISPLACEMENT OF CERVICAL INTERVERTEBRAL DISC WITHOUT MYELOPATHY: ICD-10-CM

## 2020-02-13 PROCEDURE — 72050 X-RAY EXAM NECK SPINE 4/5VWS: CPT

## 2020-02-13 PROCEDURE — 99024 POSTOP FOLLOW-UP VISIT: CPT | Performed by: NURSE PRACTITIONER

## 2020-02-13 NOTE — TELEPHONE ENCOUNTER
Tried to reach Brittany with her x-ray results, no answer, will have to keep trying        X-ray looks good and she may come out of her collar.

## 2020-02-13 NOTE — PROGRESS NOTES
"  Chief complaint:   Chief Complaint   Patient presents with   • Post-op     Dipika is returning for a post op visit for her cervical surgery, she is doing much better and seems very pleased with her progress.          Subjective     HPI: This is a 39 y.o. female patient who went to the operating room on 1/20/2020 for a Cervical Discectomy Anterior With Fusion C5-6. The patient is here in follow up today for postoperative visit.  She states overall she feels like she is doing very well.  She is happy and satisfied with results of the surgery.  She is not complaining of any neck pain.  Denies any difficulty with swallowing.  She does have some numbness in one finger in her right hand but states that this is improving as well.  She is not taking any pain medication.  She is out of the cervical collar today        Review of Systems   Musculoskeletal: Negative for neck pain.   Neurological: Positive for numbness.         Objective      Vital Signs  /82 (BP Location: Right arm, Patient Position: Sitting)   Ht 181.6 cm (71.5\")   Wt 135 kg (297 lb 3.2 oz)   BMI 40.87 kg/m²     Physical Exam   Constitutional: She is oriented to person, place, and time. She appears well-developed and well-nourished.   HENT:   Head: Normocephalic.   Eyes: Pupils are equal, round, and reactive to light. EOM are normal.   Neck: Normal range of motion.   Pulmonary/Chest: Effort normal.   Musculoskeletal: Normal range of motion.   Neurological: She is alert and oriented to person, place, and time. She has normal strength and normal reflexes. No cranial nerve deficit or sensory deficit. Gait normal. GCS eye subscore is 4. GCS verbal subscore is 5. GCS motor subscore is 6.   Skin: Skin is warm.   Psychiatric: She has a normal mood and affect. Her speech is normal and behavior is normal. Thought content normal.     Incisions clean dry and intact    Results Review: No new imaging          Assessment/Plan: The patient appears to be doing very " well.  We will send her for set of x-rays of the cervical spine to make sure that the hardware is in the proper positioning.  We will follow-up again with her in 6 to 8 weeks.  She can maintain her activity restrictions at this time and if she is doing good when Dr. Vernon sees her again we will release her from all restrictions.    Patient is a non-smoker  BMI shows the patient is Extremely Obese. BMI chart was given to the patient.     Dipika was seen today for post-op.    Diagnoses and all orders for this visit:    Displacement of cervical intervertebral disc without myelopathy  -     XR Spine Cervical Complete 4 or 5 View; Future    BMI 40.0-44.9, adult (CMS/[facility])    Non-smoker        I discussed the patients findings and my recommendations with patient  Mehul Narvaez, MALI  02/13/20  1:05 PM    Answers for HPI/ROS submitted by the patient on 2/11/2020   What is the primary reason for your visit?: Other  Please describe your symptoms.: Check up  Have you had these symptoms before?: No  How long have you been having these symptoms?: Other

## 2020-02-13 NOTE — TELEPHONE ENCOUNTER
PATIENT CALLED ABOUT A MISSED CALL FROM OUR OFFICE. PER ENZO LEGGETT I WAS ABLE TO LET THE PATIENT KNOW HER XRAY LOOKED GOOD AND THAT SHE COULD COME OUT OF HER COLLAR.     THE PATIENT VOICED UNDERSTANDING.

## 2020-02-25 DIAGNOSIS — R20.2 NUMBNESS AND TINGLING OF RIGHT UPPER EXTREMITY: ICD-10-CM

## 2020-02-25 DIAGNOSIS — R20.0 NUMBNESS AND TINGLING OF RIGHT UPPER EXTREMITY: ICD-10-CM

## 2020-02-25 RX ORDER — TIZANIDINE 4 MG/1
TABLET ORAL
Qty: 90 TABLET | Refills: 0 | OUTPATIENT
Start: 2020-02-25

## 2020-02-25 RX ORDER — DICLOFENAC SODIUM 75 MG/1
TABLET, DELAYED RELEASE ORAL
Qty: 60 TABLET | Refills: 1 | OUTPATIENT
Start: 2020-02-25

## 2020-04-14 ENCOUNTER — OFFICE VISIT (OUTPATIENT)
Dept: NEUROSURGERY | Facility: CLINIC | Age: 40
End: 2020-04-14

## 2020-04-14 VITALS — HEIGHT: 71 IN | BODY MASS INDEX: 41.02 KG/M2 | WEIGHT: 293 LBS

## 2020-04-14 DIAGNOSIS — M50.20 DISPLACEMENT OF CERVICAL INTERVERTEBRAL DISC WITHOUT MYELOPATHY: Primary | ICD-10-CM

## 2020-04-14 DIAGNOSIS — M79.601 RIGHT ARM PAIN: ICD-10-CM

## 2020-04-14 DIAGNOSIS — Z78.9 NON-SMOKER: ICD-10-CM

## 2020-04-14 PROCEDURE — 99024 POSTOP FOLLOW-UP VISIT: CPT | Performed by: NEUROLOGICAL SURGERY

## 2020-04-14 NOTE — PROGRESS NOTES
SUBJECTIVE:  Patient ID: Dipika Zendejas is a 40 y.o. female is here today for follow-up.    Chief Complaint: Neck pain  No chief complaint on file.      HPI  40-year-old female to the operating room on January 20, 2020 for a C5-6 anterior cervical fusion.  She is here in follow-up.  She has no complaints of neck pain no upper extremity pain.  She does have some numbness in the tip of her third finger on the right hand.  She is very satisfied with results of her surgery.    The following portions of the patient's history were reviewed and updated as appropriate: allergies, current medications, past family history, past medical history, past social history, past surgical history and problem list.    OBJECTIVE:    Review of Systems   All other systems reviewed and are negative.         Physical Exam   Constitutional: She is oriented to person, place, and time. She appears well-developed and well-nourished.   HENT:   Head: Normocephalic and atraumatic.   Right Ear: Hearing normal.   Left Ear: Hearing normal.   Eyes: Pupils are equal, round, and reactive to light. EOM are normal.   Neck: Normal range of motion.   Neurological: She is alert and oriented to person, place, and time. She has normal strength and normal reflexes. No cranial nerve deficit or sensory deficit. She displays a negative Romberg sign. GCS eye subscore is 4. GCS verbal subscore is 5. GCS motor subscore is 6. She displays no Babinski's sign on the right side. She displays no Babinski's sign on the left side.   Psychiatric: Her speech is normal. Judgment normal. Cognition and memory are normal.       Neurologic Exam     Mental Status   Oriented to person, place, and time.   Speech: speech is normal     Cranial Nerves     CN III, IV, VI   Pupils are equal, round, and reactive to light.  Extraocular motions are normal.     Motor Exam     Strength   Strength 5/5 throughout.       Independent Review of Radiographic Studies:   Plain x-rays of the cervical  spine show good positioning of the interbody device and hardware    ASSESSMENT/PLAN:  Patient is doing very well after her single fusion.  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  We will see her in follow-up in 3 months      No diagnosis found.        No follow-ups on file.      John Vernon MD            Answers for HPI/ROS submitted by the patient on 4/7/2020   What is the primary reason for your visit?: Other  Please describe your symptoms.: Checkup  Have you had these symptoms before?: Yes  How long have you been having these symptoms?: Other

## 2020-04-14 NOTE — PATIENT INSTRUCTIONS
"PATIENT TO CONTINUE TO FOLLOW UP WITH HER PRIMARY CARE PROVIDER FOR YEARLY PHYSICAL EXAMS TO ENSURE COMPLETE HEALTH MAINTENANCE        BMI for Adults    Body mass index (BMI) is a number that is calculated from a person's weight and height. BMI may help to estimate how much of a person's weight is composed of fat. BMI can help identify those who may be at higher risk for certain medical problems.  How is BMI used with adults?  BMI is used as a screening tool to identify possible weight problems. It is used to check whether a person is obese, overweight, healthy weight, or underweight.  How is BMI calculated?  BMI measures your weight and compares it to your height. This can be done either in English (U[place] or metric measurements. Note that charts are available to help you find your BMI quickly and easily without having to do these calculations yourself.  To calculate your BMI in English ([place] measurements, your health care provider will:  1. Measure your weight in pounds (lb).  2. Multiply the number of pounds by 703.  ? For example, for a person who weighs 180 lb, multiply that number by 703, which equals 126,540.  3. Measure your height in inches (in). Then multiply that number by itself to get a measurement called \"inches squared.\"  ? For example, for a person who is 70 in tall, the \"inches squared\" measurement is 70 in x 70 in, which equals 4900 inches squared.  4. Divide the total from Step 2 (number of lb x 703) by the total from Step 3 (inches squared): 126,540 ÷ 4900 = 25.8. This is your BMI.  To calculate your BMI in metric measurements, your health care provider will:  1. Measure your weight in kilograms (kg).  2. Measure your height in meters (m). Then multiply that number by itself to get a measurement called \"meters squared.\"  ? For example, for a person who is 1.75 m tall, the \"meters squared\" measurement is 1.75 m x 1.75 m, which is equal to 3.1 meters squared.  3. Divide the number of kilograms " (your weight) by the meters squared number. In this example: 70 ÷ 3.1 = 22.6. This is your BMI.  How is BMI interpreted?  To interpret your results, your health care provider will use BMI charts to identify whether you are underweight, normal weight, overweight, or obese. The following guidelines will be used:  · Underweight: BMI less than 18.5.  · Normal weight: BMI between 18.5 and 24.9.  · Overweight: BMI between 25 and 29.9.  · Obese: BMI of 30 and above.  Please note:  · Weight includes both fat and muscle, so someone with a muscular build, such as an athlete, may have a BMI that is higher than 24.9. In cases like these, BMI is not an accurate measure of body fat.  · To determine if excess body fat is the cause of a BMI of 25 or higher, further assessments may need to be done by a health care provider.  · BMI is usually interpreted in the same way for men and women.  Why is BMI a useful tool?  BMI is useful in two ways:  · Identifying a weight problem that may be related to a medical condition, or that may increase the risk for medical problems.  · Promoting lifestyle and diet changes in order to reach a healthy weight.  Summary  · Body mass index (BMI) is a number that is calculated from a person's weight and height.  · BMI may help to estimate how much of a person's weight is composed of fat. BMI can help identify those who may be at higher risk for certain medical problems.  · BMI can be measured using English measurements or metric measurements.  · To interpret your results, your health care provider will use BMI charts to identify whether you are underweight, normal weight, overweight, or obese.  This information is not intended to replace advice given to you by your health care provider. Make sure you discuss any questions you have with your health care provider.  Document Released: 08/29/2005 Document Revised: 10/31/2018 Document Reviewed: 10/31/2018  Elsevier Interactive Patient Education © 2020  [employer]

## 2020-05-15 ENCOUNTER — OFFICE VISIT (OUTPATIENT)
Dept: INTERNAL MEDICINE | Facility: CLINIC | Age: 40
End: 2020-05-15

## 2020-05-15 VITALS
BODY MASS INDEX: 41.02 KG/M2 | DIASTOLIC BLOOD PRESSURE: 77 MMHG | TEMPERATURE: 98.2 F | RESPIRATION RATE: 16 BRPM | OXYGEN SATURATION: 98 % | HEART RATE: 84 BPM | WEIGHT: 293 LBS | HEIGHT: 71 IN | SYSTOLIC BLOOD PRESSURE: 127 MMHG

## 2020-05-15 DIAGNOSIS — E11.65 TYPE 2 DIABETES MELLITUS WITH HYPERGLYCEMIA, WITHOUT LONG-TERM CURRENT USE OF INSULIN (HCC): Primary | ICD-10-CM

## 2020-05-15 DIAGNOSIS — E78.2 MIXED HYPERLIPIDEMIA: ICD-10-CM

## 2020-05-15 DIAGNOSIS — Z11.59 ENCOUNTER FOR HEPATITIS C SCREENING TEST FOR LOW RISK PATIENT: ICD-10-CM

## 2020-05-15 PROBLEM — M79.601 RIGHT ARM PAIN: Status: RESOLVED | Noted: 2019-09-17 | Resolved: 2020-05-15

## 2020-05-15 PROBLEM — R20.2 NUMBNESS AND TINGLING OF RIGHT UPPER EXTREMITY: Status: RESOLVED | Noted: 2019-09-17 | Resolved: 2020-05-15

## 2020-05-15 PROBLEM — R20.0 NUMBNESS AND TINGLING OF RIGHT UPPER EXTREMITY: Status: RESOLVED | Noted: 2019-09-17 | Resolved: 2020-05-15

## 2020-05-15 LAB — HBA1C MFR BLD: 6.4 %

## 2020-05-15 PROCEDURE — 99213 OFFICE O/P EST LOW 20 MIN: CPT | Performed by: INTERNAL MEDICINE

## 2020-05-15 PROCEDURE — 83036 HEMOGLOBIN GLYCOSYLATED A1C: CPT | Performed by: INTERNAL MEDICINE

## 2020-05-15 NOTE — PROGRESS NOTES
Chief Complaint   Patient presents with   • Follow-up         History:  Dipika Zendejas is a 40 y.o. female who presents today for evaluation of the above problems.        On January 20, 2020 she had cervical discectomy anterior fusion at C5-C6 for herniated disc.  She was seeing Dr. garcia for the neuropathic pain and was getting gabapentin.  She did well postoperatively and was discharged without any complications.    This is a 3-month follow-up.  At the last visit I saw her for the first time and she was diagnosed with type 2 diabetes.  Her A1c was 7.0.  She was started on metformin 500 mg twice a day, Lipitor 20 mg and lisinopril 10 mg daily.  She denies any side effects from these medications.  No issues getting them or taking them.  She walks 2 to 4 miles nightly and her weight has remained stable.  She denies any shortness of breath, cough, fever, chest pain anxiety or depression.    She rarely has to take Zanaflex  HPI    ROS:  Review of Systems   Constitutional: Negative for activity change, appetite change and fever.   Respiratory: Negative for cough and shortness of breath.    Cardiovascular: Negative for chest pain.   Gastrointestinal: Negative.    Musculoskeletal: Negative.    Neurological: Negative for numbness (Feeling coming back in her right fingers).   Psychiatric/Behavioral: Negative.        No Known Allergies  Past Medical History:   Diagnosis Date   • Neck pain    • No known health problems    • Numbness and tingling in right hand 2018     Past Surgical History:   Procedure Laterality Date   • ANTERIOR CERVICAL DISCECTOMY W/ FUSION N/A 1/20/2020    Procedure: CERVICAL DISCECTOMY ANTERIOR WITH FUSION C5-6;  Surgeon: John Vernon MD;  Location: [facility] OR;  Service: Neurosurgery   • CARPAL TUNNEL RELEASE Bilateral    • ULNAR NERVE DECOMPRESSION Left      Family History   Problem Relation Age of Onset   • Arthritis Mother    • Diabetes Mother    • Heart disease Father    • No Known Problems  "Brother      Dipika  reports that she has never smoked. She has never used smokeless tobacco. She reports that she does not drink alcohol or use drugs.    I have reviewed and updated the above documentation (if necessary) including but not limited to chief complaint, ROS, PFSH, allergies and medications        Current Outpatient Medications:   •  acetaminophen (TYLENOL) 500 MG tablet, Take 500 mg by mouth Every 6 (Six) Hours As Needed for Mild Pain  (as needed for pain)., Disp: , Rfl:   •  atorvastatin (LIPITOR) 20 MG tablet, Take 1 tablet by mouth Daily., Disp: 30 tablet, Rfl: 5  •  lisinopril (PRINIVIL,ZESTRIL) 10 MG tablet, Take 1 tablet by mouth Daily., Disp: 30 tablet, Rfl: 5  •  metFORMIN (GLUCOPHAGE) 500 MG tablet, Take 1 tablet by mouth 2 (Two) Times a Day With Meals., Disp: 60 tablet, Rfl: 5  •  tiZANidine (ZANAFLEX) 4 MG tablet, TAKE 1 TABLET BY MOUTH EVERY 8 HOURS AS NEEDED FOR MUSCLE SPASMS, Disp: 90 tablet, Rfl: 0      OBJECTIVE:  Visit Vitals  /77 (BP Location: Left arm, Patient Position: Sitting, Cuff Size: Adult)   Pulse 84   Temp 98.2 °F (36.8 °C) (Oral)   Resp 16   Ht 181.6 cm (71.5\")   Wt 135 kg (298 lb 3.2 oz)   SpO2 98%   BMI 41.02 kg/m²      Physical Exam   Constitutional: She appears well-developed and well-nourished. No distress.   HENT:   Head: Normocephalic and atraumatic.   Eyes: Pupils are equal, round, and reactive to light. EOM are normal.   Neck: Phonation normal. No thyromegaly present.   Pulmonary/Chest: No respiratory distress.   Neurological: She is alert.   Skin: She is not diaphoretic. No erythema. No pallor.   Psychiatric: She has a normal mood and affect. Her behavior is normal. Judgment and thought content normal.   Vitals reviewed.      MDM    Assessment/Plan    Dipika was seen today for follow-up.    Diagnoses and all orders for this visit:    Type 2 diabetes mellitus with hyperglycemia, without long-term current use of insulin (CMS/[facility])  -     POC Glycosylated " Hemoglobin (Hb A1C)  -     Hemoglobin A1c; Future    Mixed hyperlipidemia  -     Comprehensive Metabolic Panel; Future  -     Lipid Panel; Future    Encounter for hepatitis C screening test for low risk patient  -     Hepatitis C antibody; Future       She has a recent diagnosis of type 2 diabetes at the last visit in January.  Her A1c has decreased from 7.0-6.4 today with metformin 500 mg twice a day and her exercise.  She is doing well with Lipitor 20 mg and lisinopril 10 mg.  No side effects.  At the next visit we will recheck A1c, CMP lipid panel and hepatitis C antibody for screening.    She continues to do well from a cervical fusion standpoint.    At the next visit we will talk about other general health measures for diabetes including foot exam, and referral to ophthalmology for diabetic eye exam.  Follow-up 6 months with labs a couple days prior      Check A1c, HCV, cmp and lipid next visit, microalbumin/creatinine ratio    Patient's Body mass index is 41.02 kg/m². BMI is above normal parameters. Recommendations include: educational material, exercise counseling and nutrition counseling.      Education materials and an After Visit Summary were printed and given to the patient at discharge.    Return in about 6 months (around 11/15/2020) for Recheck.         MELI Schmidt MD   2:07 PM  5/15/2020   Answers for HPI/ROS submitted by the patient on 5/14/2020   What is the primary reason for your visit?: Other  Please describe your symptoms.: Check up  Have you had these symptoms before?: Yes  How long have you been having these symptoms?: Greater than 2 weeks

## 2020-07-14 ENCOUNTER — OFFICE VISIT (OUTPATIENT)
Dept: NEUROSURGERY | Facility: CLINIC | Age: 40
End: 2020-07-14

## 2020-07-14 VITALS
SYSTOLIC BLOOD PRESSURE: 142 MMHG | DIASTOLIC BLOOD PRESSURE: 82 MMHG | WEIGHT: 293 LBS | HEIGHT: 71 IN | BODY MASS INDEX: 41.02 KG/M2

## 2020-07-14 DIAGNOSIS — Z78.9 NON-SMOKER: ICD-10-CM

## 2020-07-14 DIAGNOSIS — M50.20 DISPLACEMENT OF CERVICAL INTERVERTEBRAL DISC WITHOUT MYELOPATHY: Primary | ICD-10-CM

## 2020-07-14 PROCEDURE — 99213 OFFICE O/P EST LOW 20 MIN: CPT | Performed by: NURSE PRACTITIONER

## 2020-07-14 NOTE — PROGRESS NOTES
"  Chief complaint:   Chief Complaint   Patient presents with   • Neck Pain     Dipika is returning for her routine follow up for her neck pain following a cervical surgery in January, she thinks she is doing well and has no complaints today.         Subjective     HPI: This is a 40 y.o. female patient who went to the operating room on 1/20/2020 for a C5-6 ACDF.  The patient is here in follow up today for postoperative visit.  She states overall she feels like she is doing very well.  She does complain of some mild pain that radiates from her shoulder region to her arm if she does too much activity but she says in general she is doing much better than what she was prior to the surgery.  She is not complaining of any numbness or tingling in her fingers.  Denies any neck pain.  Overall she is very happy and satisfied with the results of the surgery        Review of Systems   Musculoskeletal: Negative.    Neurological: Negative.          Objective      Vital Signs  /82 (BP Location: Right arm, Patient Position: Sitting)   Ht 180.3 cm (71\")   Wt 134 kg (295 lb 6.4 oz)   BMI 41.20 kg/m²     Physical Exam   Constitutional: She is oriented to person, place, and time. She appears well-developed and well-nourished.   HENT:   Head: Normocephalic.   Eyes: Pupils are equal, round, and reactive to light. EOM are normal.   Neck: Normal range of motion.   Pulmonary/Chest: Effort normal.   Musculoskeletal: Normal range of motion.   Neurological: She is alert and oriented to person, place, and time. She has normal strength and normal reflexes. No cranial nerve deficit or sensory deficit. Gait normal. GCS eye subscore is 4. GCS verbal subscore is 5. GCS motor subscore is 6.   Skin: Skin is warm.   Psychiatric: She has a normal mood and affect. Her speech is normal and behavior is normal. Thought content normal.     Incisions clean dry and intact    Neurologic Exam     Mental Status   Oriented to person, place, and time. "   Speech: speech is normal     Cranial Nerves     CN III, IV, VI   Pupils are equal, round, and reactive to light.  Extraocular motions are normal.     Motor Exam     Strength   Strength 5/5 throughout.       Results Review: No new imaging          Assessment/Plan: Patient is doing well from a surgical standpoint.  At this point she is about 6 months out from her surgery.  She was told to call us if she had any further issues.  We will need to see her on an as-needed basis.      Patient is a non-smoker  BMI shows the patient is Extremely Obese. BMI chart was given to the patient.     Dipika was seen today for neck pain.    Diagnoses and all orders for this visit:    Displacement of cervical intervertebral disc without myelopathy    Non-smoker    BMI 40.0-44.9, adult (CMS/[facility])        I discussed the patients findings and my recommendations with patient  Mehul Narvaez, MALI  07/14/20  15:01

## 2020-07-14 NOTE — PATIENT INSTRUCTIONS

## 2020-07-21 DIAGNOSIS — E11.65 TYPE 2 DIABETES MELLITUS WITH HYPERGLYCEMIA, WITHOUT LONG-TERM CURRENT USE OF INSULIN (HCC): ICD-10-CM

## 2020-07-21 RX ORDER — LISINOPRIL 10 MG/1
TABLET ORAL
Qty: 30 TABLET | Refills: 5 | Status: SHIPPED | OUTPATIENT
Start: 2020-07-21 | End: 2021-01-04 | Stop reason: SDUPTHER

## 2020-11-16 ENCOUNTER — OFFICE VISIT (OUTPATIENT)
Dept: INTERNAL MEDICINE | Facility: CLINIC | Age: 40
End: 2020-11-16

## 2020-11-16 ENCOUNTER — LAB (OUTPATIENT)
Dept: LAB | Facility: HOSPITAL | Age: 40
End: 2020-11-16

## 2020-11-16 VITALS
TEMPERATURE: 97.8 F | HEIGHT: 71 IN | BODY MASS INDEX: 41.02 KG/M2 | HEART RATE: 84 BPM | DIASTOLIC BLOOD PRESSURE: 60 MMHG | SYSTOLIC BLOOD PRESSURE: 118 MMHG | WEIGHT: 293 LBS | RESPIRATION RATE: 16 BRPM | OXYGEN SATURATION: 98 %

## 2020-11-16 DIAGNOSIS — Z11.59 ENCOUNTER FOR HEPATITIS C SCREENING TEST FOR LOW RISK PATIENT: ICD-10-CM

## 2020-11-16 DIAGNOSIS — E78.2 MIXED HYPERLIPIDEMIA: ICD-10-CM

## 2020-11-16 DIAGNOSIS — E11.65 TYPE 2 DIABETES MELLITUS WITH HYPERGLYCEMIA, WITHOUT LONG-TERM CURRENT USE OF INSULIN (HCC): ICD-10-CM

## 2020-11-16 DIAGNOSIS — E11.65 TYPE 2 DIABETES MELLITUS WITH HYPERGLYCEMIA, WITHOUT LONG-TERM CURRENT USE OF INSULIN (HCC): Primary | ICD-10-CM

## 2020-11-16 DIAGNOSIS — R20.0 NUMBNESS AND TINGLING OF RIGHT UPPER EXTREMITY: ICD-10-CM

## 2020-11-16 DIAGNOSIS — R20.2 NUMBNESS AND TINGLING OF RIGHT UPPER EXTREMITY: ICD-10-CM

## 2020-11-16 LAB
ALBUMIN SERPL-MCNC: 4.9 G/DL (ref 3.5–5.2)
ALBUMIN/GLOB SERPL: 1.6 G/DL
ALP SERPL-CCNC: 59 U/L (ref 39–117)
ALT SERPL W P-5'-P-CCNC: 21 U/L (ref 1–33)
ANION GAP SERPL CALCULATED.3IONS-SCNC: 10 MMOL/L (ref 5–15)
AST SERPL-CCNC: 16 U/L (ref 1–32)
BILIRUB SERPL-MCNC: 0.6 MG/DL (ref 0–1.2)
BUN SERPL-MCNC: 8 MG/DL (ref 6–20)
BUN/CREAT SERPL: 14.5 (ref 7–25)
CALCIUM SPEC-SCNC: 9.9 MG/DL (ref 8.6–10.5)
CHLORIDE SERPL-SCNC: 101 MMOL/L (ref 98–107)
CO2 SERPL-SCNC: 27 MMOL/L (ref 22–29)
CREAT SERPL-MCNC: 0.55 MG/DL (ref 0.57–1)
GFR SERPL CREATININE-BSD FRML MDRD: 123 ML/MIN/1.73
GLOBULIN UR ELPH-MCNC: 3 GM/DL
GLUCOSE SERPL-MCNC: 116 MG/DL (ref 65–99)
POTASSIUM SERPL-SCNC: 4 MMOL/L (ref 3.5–5.2)
PROT SERPL-MCNC: 7.9 G/DL (ref 6–8.5)
SODIUM SERPL-SCNC: 138 MMOL/L (ref 136–145)

## 2020-11-16 PROCEDURE — 99213 OFFICE O/P EST LOW 20 MIN: CPT | Performed by: INTERNAL MEDICINE

## 2020-11-16 PROCEDURE — 36415 COLL VENOUS BLD VENIPUNCTURE: CPT

## 2020-11-16 PROCEDURE — 86803 HEPATITIS C AB TEST: CPT

## 2020-11-16 PROCEDURE — 82043 UR ALBUMIN QUANTITATIVE: CPT

## 2020-11-16 PROCEDURE — 82570 ASSAY OF URINE CREATININE: CPT

## 2020-11-16 PROCEDURE — 80053 COMPREHEN METABOLIC PANEL: CPT

## 2020-11-16 PROCEDURE — 80061 LIPID PANEL: CPT

## 2020-11-16 PROCEDURE — 83036 HEMOGLOBIN GLYCOSYLATED A1C: CPT

## 2020-11-16 RX ORDER — TIZANIDINE 4 MG/1
4 TABLET ORAL EVERY 8 HOURS PRN
Qty: 90 TABLET | Refills: 1 | Status: SHIPPED | OUTPATIENT
Start: 2020-11-16

## 2020-11-16 NOTE — PROGRESS NOTES
Chief Complaint   Patient presents with   • Follow-up   • Discussion of Labs       Answers for HPI/ROS submitted by the patient on 11/14/2020   What is the primary reason for your visit?: Other  Please describe your symptoms.: Check up  Have you had these symptoms before?: Yes  How long have you been having these symptoms?: Greater than 2 weeks    History:  Dipika Zendejas is a 40 y.o. female who presents today for evaluation of the above problems.      She presents today for a follow-up on diabetes and hyperlipidemia.  She is taking Metformin 500 mg daily for diabetes without any medication side effects.  She continues taking it.    She takes Lipitor 20 mg daily for hyperlipidemia without any myalgias.    She also takes lisinopril 10 mg daily for renal protection for diabetes.  Has never been diagnosed with hypertension.  Blood pressure perfect today    She takes Zanaflex only at night.  In January 20, 2020 she had cervical discectomy anterior fusion at C5-C6 for herniated disc.  She did well postoperatively, however continues with shoulder pain.  Neck pain is resolved now.      On January 20, 2020 she had cervical discectomy anterior fusion at C5-C6 for herniated disc.  She was seeing Dr. garcia for the neuropathic pain and was getting gabapentin.  She did well postoperatively and was discharged without any complications.      HPI    ROS:  Review of Systems   Constitutional: Negative for activity change, appetite change and fever.   Respiratory: Negative for cough and shortness of breath.    Cardiovascular: Negative for chest pain.   Gastrointestinal: Negative.    Musculoskeletal: Positive for arthralgias (shoulder, mild ). Negative for neck pain.   Neurological: Negative for numbness.   Psychiatric/Behavioral: Negative.        No Known Allergies  Past Medical History:   Diagnosis Date   • Neck pain    • No known health problems    • Numbness and tingling in right hand 2018     Past Surgical History:   Procedure  "Laterality Date   • ANTERIOR CERVICAL DISCECTOMY W/ FUSION N/A 1/20/2020    Procedure: CERVICAL DISCECTOMY ANTERIOR WITH FUSION C5-6;  Surgeon: John Vernon MD;  Location: [facility];  Service: Neurosurgery   • CARPAL TUNNEL RELEASE Bilateral    • ULNAR NERVE DECOMPRESSION Left      Family History   Problem Relation Age of Onset   • Arthritis Mother    • Diabetes Mother    • Heart disease Father    • No Known Problems Brother      Dipika  reports that she has never smoked. She has never used smokeless tobacco. She reports that she does not drink alcohol or use drugs.    I have reviewed and updated the above documentation (if necessary) including but not limited to chief complaint, ROS, PFSH, allergies and medications        Current Outpatient Medications:   •  atorvastatin (LIPITOR) 20 MG tablet, Take 1 tablet by mouth Daily., Disp: 30 tablet, Rfl: 5  •  lisinopril (PRINIVIL,ZESTRIL) 10 MG tablet, TAKE 1 TABLET BY MOUTH EVERY DAY, Disp: 30 tablet, Rfl: 5  •  metFORMIN (GLUCOPHAGE) 500 MG tablet, TAKE 1 TABLET BY MOUTH TWICE DAILY WITH MEALS, Disp: 60 tablet, Rfl: 5  •  tiZANidine (ZANAFLEX) 4 MG tablet, Take 1 tablet by mouth Every 8 (Eight) Hours As Needed for Muscle Spasms., Disp: 90 tablet, Rfl: 1      OBJECTIVE:  Visit Vitals  /60 (BP Location: Left arm, Patient Position: Sitting, Cuff Size: Adult)   Pulse 84   Temp 97.8 °F (36.6 °C) (Oral)   Resp 16   Ht 181.6 cm (71.5\")   Wt 134 kg (295 lb)   SpO2 98%   BMI 40.58 kg/m²      Physical Exam   Constitutional: She appears well-developed. No distress.   HENT:   Head: Normocephalic and atraumatic.   Eyes: Pupils are equal, round, and reactive to light.   Neck: Phonation normal. No thyromegaly present.   Cardiovascular: Normal rate and regular rhythm.   No murmur heard.  Pulmonary/Chest: Effort normal and breath sounds normal. No stridor. No respiratory distress. She has no wheezes. She has no rhonchi.   Neurological: She is alert.   Skin: She is not " diaphoretic. No erythema. No pallor.   Psychiatric: Her behavior is normal. Judgment and thought content normal.   Vitals reviewed.      MDM    Assessment/Plan    Diagnoses and all orders for this visit:    1. Type 2 diabetes mellitus with hyperglycemia, without long-term current use of insulin (CMS/[facility]) (Primary)    2. Numbness and tingling of right upper extremity  -     tiZANidine (ZANAFLEX) 4 MG tablet; Take 1 tablet by mouth Every 8 (Eight) Hours As Needed for Muscle Spasms.  Dispense: 90 tablet; Refill: 1    3. Mixed hyperlipidemia       She did obtain labs about an hour and a half prior to her visit.  Her CMP is back which was discussed with her.  A1c, lipid panel, microalbumin are pending at this time.  Will discuss with her tomorrow and to get the results and adjust medications based on those results.    She continues on metformin 500 mg daily, Lipitor 20 mg daily and lisinopril 10 mg daily without any side effects    She is declined influenza vaccine this year    She continues to do well from a cervical fusion standpoint.    Follow-up 6 months with labs a couple days prior    Patient's Body mass index is 40.58 kg/m². BMI is above normal parameters. Recommendations include: educational material, exercise counseling and nutrition counseling.      Education materials and an After Visit Summary were printed and given to the patient at discharge.    Return in about 6 months (around 5/16/2021) for Recheck.         MELI Schmidt MD   2:07 PM  11/16/2020

## 2020-11-17 ENCOUNTER — TELEPHONE (OUTPATIENT)
Dept: INTERNAL MEDICINE | Facility: CLINIC | Age: 40
End: 2020-11-17

## 2020-11-17 LAB
ALBUMIN UR-MCNC: <1.2 MG/DL
CHOLEST SERPL-MCNC: 125 MG/DL (ref 0–200)
CREAT UR-MCNC: 9.5 MG/DL
HBA1C MFR BLD: 6.14 % (ref 4.8–5.6)
HCV AB SER DONR QL: NORMAL
HDLC SERPL-MCNC: 46 MG/DL (ref 40–60)
LDLC SERPL CALC-MCNC: 51 MG/DL (ref 0–100)
LDLC/HDLC SERPL: 0.99 {RATIO}
MICROALBUMIN/CREAT UR: NORMAL MG/G{CREAT}
TRIGL SERPL-MCNC: 167 MG/DL (ref 0–150)
VLDLC SERPL-MCNC: 28 MG/DL (ref 5–40)

## 2020-11-17 NOTE — TELEPHONE ENCOUNTER
----- Message from NISHI Schmidt MD sent at 11/17/2020  8:09 AM CST -----  Can you call patient to notify them of normal labs?  Her labs looked very good.  Her A1c has decreased even more to 6.14.  We will continue her current regimen.  Hepatitis C antibody was undetected and there is no protein in her urine.  Her cholesterol looks good as well.  Triglycerides are slightly elevated but this is related to nonfasting labs. thanks

## 2020-12-15 DIAGNOSIS — E78.2 MIXED HYPERLIPIDEMIA: ICD-10-CM

## 2020-12-15 RX ORDER — ATORVASTATIN CALCIUM 20 MG/1
20 TABLET, FILM COATED ORAL DAILY
Qty: 30 TABLET | Refills: 5 | Status: SHIPPED | OUTPATIENT
Start: 2020-12-15 | End: 2021-07-02

## 2021-01-04 DIAGNOSIS — E11.65 TYPE 2 DIABETES MELLITUS WITH HYPERGLYCEMIA, WITHOUT LONG-TERM CURRENT USE OF INSULIN (HCC): ICD-10-CM

## 2021-01-04 RX ORDER — LISINOPRIL 10 MG/1
10 TABLET ORAL DAILY
Qty: 30 TABLET | Refills: 5 | Status: SHIPPED | OUTPATIENT
Start: 2021-01-04

## 2021-07-01 DIAGNOSIS — E78.2 MIXED HYPERLIPIDEMIA: ICD-10-CM

## 2021-07-02 RX ORDER — ATORVASTATIN CALCIUM 20 MG/1
20 TABLET, FILM COATED ORAL DAILY
Qty: 30 TABLET | Refills: 5 | Status: SHIPPED | OUTPATIENT
Start: 2021-07-02

## (undated) DEVICE — TRY PREP SCRB VAG PVP

## (undated) DEVICE — COLR CERV VISTA TX UNIV

## (undated) DEVICE — ANTIBACTERIAL UNDYED BRAIDED (POLYGLACTIN 910), SYNTHETIC ABSORBABLE SUTURE: Brand: COATED VICRYL

## (undated) DEVICE — HALTR TRACT HD CERV STD UNIV

## (undated) DEVICE — DRP C/ARMOR

## (undated) DEVICE — PACK,UNIVERSAL,NO GOWNS: Brand: MEDLINE

## (undated) DEVICE — ELECTRD BLD EDGE/INSUL1P 2.4X5.1MM STRL

## (undated) DEVICE — DISPOSABLE IRRIGATION CASSETTE: Brand: CORE

## (undated) DEVICE — CATH IV ANGIO FEP 12G 3IN LTBLU 10PK

## (undated) DEVICE — 5.0MM BARREL STRAIGHT FLUTE

## (undated) DEVICE — GLV SURG BIOGEL LTX PF 6 1/2

## (undated) DEVICE — CONN FLX BREATHE CIRCT

## (undated) DEVICE — NEEDLE, QUINCKE, 18GX3.5": Brand: MEDLINE

## (undated) DEVICE — SPNG DISSCT CHRRY 3/8IN STRL PK/5

## (undated) DEVICE — SUT MNCRYL 4/0 PS2 27IN UD MCP426H

## (undated) DEVICE — GLV SURG SENSICARE PI LF PF 8 GRN STRL

## (undated) DEVICE — LIMB HOLDER, WRIST/ANKLE: Brand: DEROYAL

## (undated) DEVICE — PROXIMATE RH ROTATING HEAD SKIN STAPLERS (35 WIDE) CONTAINS 35 STAINLESS STEEL STAPLES: Brand: PROXIMATE

## (undated) DEVICE — PK SPINE CERV ANT 30

## (undated) DEVICE — PK TURNOVER RM ADV

## (undated) DEVICE — GLV SURG BIOGEL LTX PF 8